# Patient Record
Sex: FEMALE | Race: WHITE | NOT HISPANIC OR LATINO | Employment: OTHER | URBAN - METROPOLITAN AREA
[De-identification: names, ages, dates, MRNs, and addresses within clinical notes are randomized per-mention and may not be internally consistent; named-entity substitution may affect disease eponyms.]

---

## 2017-03-02 ENCOUNTER — OFFICE VISIT (OUTPATIENT)
Dept: FAMILY MEDICINE CLINIC | Facility: CLINIC | Age: 52
End: 2017-03-02

## 2017-03-02 VITALS
DIASTOLIC BLOOD PRESSURE: 80 MMHG | HEART RATE: 73 BPM | HEIGHT: 63 IN | RESPIRATION RATE: 16 BRPM | BODY MASS INDEX: 36.14 KG/M2 | OXYGEN SATURATION: 97 % | SYSTOLIC BLOOD PRESSURE: 130 MMHG | WEIGHT: 204 LBS

## 2017-03-02 DIAGNOSIS — F41.9 ANXIETY: Primary | ICD-10-CM

## 2017-03-02 PROCEDURE — 99213 OFFICE O/P EST LOW 20 MIN: CPT | Performed by: INTERNAL MEDICINE

## 2017-03-02 RX ORDER — ALPRAZOLAM 0.5 MG/1
0.5 TABLET ORAL 2 TIMES DAILY PRN
Qty: 30 TABLET | Refills: 0 | Status: SHIPPED | OUTPATIENT
Start: 2017-03-02 | End: 2017-06-01

## 2017-03-02 NOTE — PROGRESS NOTES
"Subjective   Patient ID: Stacy Salomon is a 51 y.o. female presents with   Chief Complaint   Patient presents with   • Anxiety     B[facility] Patient-  suddenly passed away needs medication for panic attack, anxiety , insomnia   • Depression       HPI - this patient presents today with grief and insomnia.  Her   in the last few days unexpectedly.  She's been having panic attacks and has been unable to sleep.  She has had Xanax in the past and requests this she will use it sparingly.  I told her not to drive after taking this medicine.  She agreed.    Assessment plan    Anxiety and grief and insomnia-Xanax 0.5 1/2-1 by mouth twice a day when necessary.  She has grief counseling arranged.    No Known Allergies    The following portions of the patient's history were reviewed and updated as appropriate: allergies, current medications, past family history, past medical history, past social history, past surgical history and problem list.      Review of Systems   Constitutional: Negative.    HENT: Negative.    Respiratory: Negative.    Cardiovascular: Negative.    Psychiatric/Behavioral: Positive for sleep disturbance. The patient is nervous/anxious.        Objective     Vitals:    17 0840   BP: 130/80   Pulse: 73   Resp: 16   SpO2: 97%   Weight: 204 lb (92.5 kg)   Height: 63\" (160 cm)         Physical Exam   Constitutional: She appears well-developed and well-nourished.   HENT:   Head: Normocephalic and atraumatic.   Eyes: EOM are normal. Pupils are equal, round, and reactive to light.   Cardiovascular: Normal rate, regular rhythm and normal heart sounds.    Pulmonary/Chest: Effort normal.   Psychiatric: She has a normal mood and affect. Her behavior is normal.   Nursing note and vitals reviewed.        Stacy was seen today for anxiety and depression.    Diagnoses and all orders for this visit:    Anxiety    Other orders  -     ALPRAZolam (XANAX) 0.5 MG tablet; Take 1 tablet by mouth 2 (Two) Times a Day " As Needed for anxiety.        Call or return to clinic prn if these symptoms worsen or fail to improve as anticipated.

## 2017-06-01 ENCOUNTER — OFFICE VISIT (OUTPATIENT)
Dept: FAMILY MEDICINE CLINIC | Facility: CLINIC | Age: 52
End: 2017-06-01

## 2017-06-01 VITALS
WEIGHT: 214.2 LBS | TEMPERATURE: 97.8 F | BODY MASS INDEX: 37.95 KG/M2 | HEART RATE: 68 BPM | SYSTOLIC BLOOD PRESSURE: 116 MMHG | HEIGHT: 63 IN | DIASTOLIC BLOOD PRESSURE: 80 MMHG | OXYGEN SATURATION: 97 %

## 2017-06-01 DIAGNOSIS — S63.501A WRIST SPRAIN, RIGHT, INITIAL ENCOUNTER: Primary | ICD-10-CM

## 2017-06-01 PROCEDURE — 99213 OFFICE O/P EST LOW 20 MIN: CPT | Performed by: FAMILY MEDICINE

## 2017-06-01 RX ORDER — FLUTICASONE PROPIONATE 50 MCG
1 SPRAY, SUSPENSION (ML) NASAL
COMMUNITY
Start: 2017-04-20 | End: 2018-07-09

## 2017-06-01 RX ORDER — MONTELUKAST SODIUM 10 MG/1
10 TABLET ORAL
COMMUNITY
Start: 2017-04-20 | End: 2017-09-02 | Stop reason: SDUPTHER

## 2017-06-01 NOTE — PROGRESS NOTES
"Subjective   Stacy Salomon is a 51 y.o. female.     Chief Complaint   Patient presents with   • Wrist Pain     right wrist pain x 2 wks NKI        History of Present Illness    Wrist pain.  2 or 3 weeks.  Doing some repetitive motion at home.  Painting and cleaning.   passed away February of this year with sudden cardiac death.  She's coping.  Daughter and she is going to grief counseling.    The wrist pain is not getting better.  Hurts when she just moves it certain ways.  She feels a \"clunk\".  There is been no swelling.  No radiculopathy.  No weakness.      The following portions of the patient's history were reviewed and updated as appropriate: allergies, current medications, past family history, past medical history, past social history, past surgical history and problem list.          Review of Systems   Constitutional: Negative for fever.   Musculoskeletal: Negative for joint swelling.       Objective   Blood pressure 116/80, pulse 68, temperature 97.8 °F (36.6 °C), temperature source Oral, height 63\" (160 cm), weight 214 lb 3.2 oz (97.2 kg), SpO2 97 %.  Physical Exam   Constitutional: No distress.   Musculoskeletal: Normal range of motion. She exhibits no edema or deformity.   Right wrist full range of motion.  No effusion.  She has some tenderness around the ulnar styloid but not on the bone itself.  More ligamentous.  No snuffbox tenderness.  No skin changes.   Skin: She is not diaphoretic.       Assessment/Plan   Stacy was seen today for wrist pain.    Diagnoses and all orders for this visit:    Wrist sprain, right, initial encounter      Mild wrist sprain.  Related to over use.  Repetitive motion at home.  At this time recommend observation.  She has a carpal tunnel splint she can wear from time to time.  Occasional Aleve as needed.  If not improved in 2 weeks, please call I would then do an x-ray.  Would also consider referral to hand surgery.    Grief.  Coping.  Counseling given today.         "

## 2017-09-05 RX ORDER — MONTELUKAST SODIUM 10 MG/1
TABLET ORAL
Qty: 30 TABLET | Refills: 5 | Status: SHIPPED | OUTPATIENT
Start: 2017-09-05 | End: 2018-04-08 | Stop reason: SDUPTHER

## 2018-04-09 RX ORDER — MONTELUKAST SODIUM 10 MG/1
TABLET ORAL
Qty: 30 TABLET | Refills: 5 | Status: SHIPPED | OUTPATIENT
Start: 2018-04-09 | End: 2018-10-23

## 2018-04-10 ENCOUNTER — APPOINTMENT (OUTPATIENT)
Dept: WOMENS IMAGING | Facility: HOSPITAL | Age: 53
End: 2018-04-10

## 2018-04-10 PROCEDURE — 77063 BREAST TOMOSYNTHESIS BI: CPT | Performed by: RADIOLOGY

## 2018-04-10 PROCEDURE — 77067 SCR MAMMO BI INCL CAD: CPT | Performed by: RADIOLOGY

## 2018-07-09 ENCOUNTER — OFFICE VISIT (OUTPATIENT)
Dept: FAMILY MEDICINE CLINIC | Facility: CLINIC | Age: 53
End: 2018-07-09

## 2018-07-09 VITALS
HEART RATE: 70 BPM | OXYGEN SATURATION: 96 % | HEIGHT: 63 IN | TEMPERATURE: 97.6 F | WEIGHT: 224.9 LBS | SYSTOLIC BLOOD PRESSURE: 135 MMHG | DIASTOLIC BLOOD PRESSURE: 81 MMHG | BODY MASS INDEX: 39.85 KG/M2

## 2018-07-09 DIAGNOSIS — R00.0 TACHYCARDIA: ICD-10-CM

## 2018-07-09 DIAGNOSIS — R03.0 ELEVATED BLOOD PRESSURE READING: Primary | ICD-10-CM

## 2018-07-09 DIAGNOSIS — Z00.00 HEALTH CARE MAINTENANCE: ICD-10-CM

## 2018-07-09 PROCEDURE — 99214 OFFICE O/P EST MOD 30 MIN: CPT | Performed by: FAMILY MEDICINE

## 2018-07-09 NOTE — PROGRESS NOTES
"Sean Salomon is a 52 y.o. female.     Chief Complaint   Patient presents with   • Hypertension     x 2 weeks   • Rapid Heart Rate        History of Present Illness    Elevated blood pressure readings.  Up to 140 or 150/90.  Mostly in the 120 or 130 range systolic.  And mostly high 80s diastolic.  She's had some heart rates in the 80s or 90s.  Nothing higher.  She's had some stressors both at work and at home.  She's gained weight.  She's not been exercising.  She has a .  She's not using the .  The stressors are stable.  No major depression symptoms at this time.  She continues with grief with the loss of her  last year.  Some vague dizziness.  No vertigo.  No syncope.      The following portions of the patient's history were reviewed and updated as appropriate: allergies, current medications, past family history, past medical history, past social history, past surgical history and problem list.          Review of Systems   Constitutional: Negative.    Respiratory: Negative.  Negative for shortness of breath.    Cardiovascular: Negative.  Negative for chest pain.   Musculoskeletal: Negative.    Neurological: Negative.  Negative for light-headedness and headaches.   Psychiatric/Behavioral: Negative for dysphoric mood. The patient is nervous/anxious.        Objective   Blood pressure 135/81, pulse 70, temperature 97.6 °F (36.4 °C), temperature source Oral, height 160 cm (62.99\"), weight 102 kg (224 lb 14.4 oz), SpO2 96 %.  Physical Exam   Constitutional: She appears well-developed and well-nourished. No distress.   Neck: No thyromegaly present.   Cardiovascular: Normal rate, regular rhythm, normal heart sounds and intact distal pulses.    Pulmonary/Chest: Effort normal and breath sounds normal.   Musculoskeletal: She exhibits no edema.   Skin: Skin is warm and dry.   Psychiatric: She has a normal mood and affect. Her behavior is normal. Judgment and thought content normal. "   Nursing note and vitals reviewed.      Assessment/Plan   Stacy was seen today for hypertension and rapid heart rate.    Diagnoses and all orders for this visit:    Elevated blood pressure reading    Tachycardia  -     CBC & Differential; Future  -     TSH Rfx On Abnormal To Free T4; Future    Health care maintenance  -     CBC & Differential; Future  -     Comprehensive Metabolic Panel; Future  -     Lipid Panel; Future  -     TSH Rfx On Abnormal To Free T4; Future  -     Vitamin D 1,25 Dihydroxy; Future      Elevated blood pressure readings.  Relative tachycardia.  Weight gain.  Stressors.  Poor diet.  Lack of exercise.  At this time recommend diet modification and exercise implementation.  She has a concrete plan.  She'll continue to monitor blood pressure readings.  They are borderline hypertensive.  If not vastly improved before next visit in 2 or 3 months, would then recommend treatment with for high blood pressure.

## 2018-10-12 LAB
1,25(OH)2D3 SERPL-MCNC: 66.5 PG/ML (ref 19.9–79.3)
ALBUMIN SERPL-MCNC: 4.4 G/DL (ref 3.5–5.5)
ALBUMIN/GLOB SERPL: 1.4 {RATIO} (ref 1.2–2.2)
ALP SERPL-CCNC: 80 IU/L (ref 39–117)
ALT SERPL-CCNC: 17 IU/L (ref 0–32)
AST SERPL-CCNC: 15 IU/L (ref 0–40)
BASOPHILS # BLD AUTO: 0 X10E3/UL (ref 0–0.2)
BASOPHILS NFR BLD AUTO: 1 %
BILIRUB SERPL-MCNC: 0.3 MG/DL (ref 0–1.2)
BUN SERPL-MCNC: 17 MG/DL (ref 6–24)
BUN/CREAT SERPL: 26 (ref 9–23)
CALCIUM SERPL-MCNC: 9.6 MG/DL (ref 8.7–10.2)
CHLORIDE SERPL-SCNC: 103 MMOL/L (ref 96–106)
CHOLEST SERPL-MCNC: 148 MG/DL (ref 100–199)
CO2 SERPL-SCNC: 24 MMOL/L (ref 20–29)
CREAT SERPL-MCNC: 0.66 MG/DL (ref 0.57–1)
EOSINOPHIL # BLD AUTO: 0.2 X10E3/UL (ref 0–0.4)
EOSINOPHIL NFR BLD AUTO: 2 %
ERYTHROCYTE [DISTWIDTH] IN BLOOD BY AUTOMATED COUNT: 13.9 % (ref 12.3–15.4)
GLOBULIN SER CALC-MCNC: 3.1 G/DL (ref 1.5–4.5)
GLUCOSE SERPL-MCNC: 94 MG/DL (ref 65–99)
HCT VFR BLD AUTO: 40.7 % (ref 34–46.6)
HDLC SERPL-MCNC: 57 MG/DL
HGB BLD-MCNC: 13.8 G/DL (ref 11.1–15.9)
IMM GRANULOCYTES # BLD: 0 X10E3/UL (ref 0–0.1)
IMM GRANULOCYTES NFR BLD: 0 %
LDLC SERPL CALC-MCNC: 83 MG/DL (ref 0–99)
LYMPHOCYTES # BLD AUTO: 3.5 X10E3/UL (ref 0.7–3.1)
LYMPHOCYTES NFR BLD AUTO: 44 %
MCH RBC QN AUTO: 29.6 PG (ref 26.6–33)
MCHC RBC AUTO-ENTMCNC: 33.9 G/DL (ref 31.5–35.7)
MCV RBC AUTO: 87 FL (ref 79–97)
MONOCYTES # BLD AUTO: 0.4 X10E3/UL (ref 0.1–0.9)
MONOCYTES NFR BLD AUTO: 5 %
NEUTROPHILS # BLD AUTO: 3.8 X10E3/UL (ref 1.4–7)
NEUTROPHILS NFR BLD AUTO: 48 %
PLATELET # BLD AUTO: 356 X10E3/UL (ref 150–379)
POTASSIUM SERPL-SCNC: 4.7 MMOL/L (ref 3.5–5.2)
PROT SERPL-MCNC: 7.5 G/DL (ref 6–8.5)
RBC # BLD AUTO: 4.67 X10E6/UL (ref 3.77–5.28)
SODIUM SERPL-SCNC: 141 MMOL/L (ref 134–144)
TRIGL SERPL-MCNC: 42 MG/DL (ref 0–149)
TSH SERPL DL<=0.005 MIU/L-ACNC: 2.06 UIU/ML (ref 0.45–4.5)
VLDLC SERPL CALC-MCNC: 8 MG/DL (ref 5–40)
WBC # BLD AUTO: 7.8 X10E3/UL (ref 3.4–10.8)

## 2018-10-23 ENCOUNTER — OFFICE VISIT (OUTPATIENT)
Dept: FAMILY MEDICINE CLINIC | Facility: CLINIC | Age: 53
End: 2018-10-23

## 2018-10-23 VITALS
HEIGHT: 63 IN | BODY MASS INDEX: 38.95 KG/M2 | TEMPERATURE: 97.6 F | WEIGHT: 219.8 LBS | DIASTOLIC BLOOD PRESSURE: 84 MMHG | HEART RATE: 68 BPM | SYSTOLIC BLOOD PRESSURE: 144 MMHG | OXYGEN SATURATION: 95 %

## 2018-10-23 DIAGNOSIS — I10 ESSENTIAL HYPERTENSION: ICD-10-CM

## 2018-10-23 DIAGNOSIS — Z23 NEED FOR HEPATITIS VACCINATION: ICD-10-CM

## 2018-10-23 DIAGNOSIS — Z23 NEED FOR IMMUNIZATION AGAINST INFLUENZA: ICD-10-CM

## 2018-10-23 DIAGNOSIS — L98.9 SKIN LESION: ICD-10-CM

## 2018-10-23 DIAGNOSIS — Z12.11 ENCOUNTER FOR SCREENING COLONOSCOPY: ICD-10-CM

## 2018-10-23 DIAGNOSIS — Z00.00 HEALTH CARE MAINTENANCE: Primary | ICD-10-CM

## 2018-10-23 PROCEDURE — 99396 PREV VISIT EST AGE 40-64: CPT | Performed by: FAMILY MEDICINE

## 2018-10-23 PROCEDURE — 90471 IMMUNIZATION ADMIN: CPT | Performed by: FAMILY MEDICINE

## 2018-10-23 PROCEDURE — 90632 HEPA VACCINE ADULT IM: CPT | Performed by: FAMILY MEDICINE

## 2018-10-23 PROCEDURE — 90674 CCIIV4 VAC NO PRSV 0.5 ML IM: CPT | Performed by: FAMILY MEDICINE

## 2018-10-23 PROCEDURE — 90472 IMMUNIZATION ADMIN EACH ADD: CPT | Performed by: FAMILY MEDICINE

## 2018-10-23 RX ORDER — HYDROCHLOROTHIAZIDE 12.5 MG/1
12.5 TABLET ORAL DAILY
Qty: 90 TABLET | Refills: 1 | Status: SHIPPED | OUTPATIENT
Start: 2018-10-23 | End: 2019-04-23 | Stop reason: SDUPTHER

## 2018-10-23 NOTE — PROGRESS NOTES
"Sean Salomon is a 52 y.o. female.     Chief Complaint   Patient presents with   • Annual Exam     follow up labs        History of Present Illness     Elevated blood pressure readings.  She's been checking at home.  Especially over the summer.  Running about 140 over high 80s on average.  No cardiovascular symptoms.  She is going to her .  She has lost some weight.  She's been watching her diet.    Social History   Substance Use Topics   • Smoking status: Never Smoker   • Smokeless tobacco: Never Used   • Alcohol use Yes      Comment: occ     Immunization History   Administered Date(s) Administered   • Tdap 05/14/2011     Family History   Problem Relation Age of Onset   • No Known Problems Mother    • Heart disease Father    • Hyperlipidemia Father      PHQ-2 Depression Screening  Little interest or pleasure in doing things? 0   Feeling down, depressed, or hopeless? 0   PHQ-2 Total Score 0           The following portions of the patient's history were reviewed and updated as appropriate: allergies, current medications, past family history, past medical history, past social history, past surgical history and problem list.          Review of Systems   Constitutional: Negative.    HENT: Negative.    Eyes: Negative.    Respiratory: Negative.    Cardiovascular: Negative.    Gastrointestinal: Negative.  Negative for blood in stool.   Endocrine: Negative.    Genitourinary: Negative.  Negative for difficulty urinating and hematuria.   Musculoskeletal: Negative.    Skin: Negative.    Neurological: Negative for headaches.   Psychiatric/Behavioral: Negative.  Negative for dysphoric mood.   All other systems reviewed and are negative.      Objective   Blood pressure 144/84, pulse 68, temperature 97.6 °F (36.4 °C), temperature source Oral, height 160 cm (62.99\"), weight 99.7 kg (219 lb 12.8 oz), SpO2 95 %.  Physical Exam   Constitutional: She is oriented to person, place, and time. She appears " well-developed and well-nourished.   HENT:   Head: Normocephalic and atraumatic.   Right Ear: Tympanic membrane, external ear and ear canal normal.   Left Ear: Tympanic membrane, external ear and ear canal normal.   Nose: Nose normal.   Mouth/Throat: Oropharynx is clear and moist. No oropharyngeal exudate.   Eyes: Pupils are equal, round, and reactive to light. EOM are normal. No scleral icterus.   Neck: Normal range of motion. Neck supple. No thyromegaly present.   Cardiovascular: Normal rate, regular rhythm, normal heart sounds and intact distal pulses.    No murmur heard.  Pulmonary/Chest: Effort normal and breath sounds normal. She has no wheezes.   Abdominal: Soft. Bowel sounds are normal. She exhibits no distension and no mass. There is no tenderness. No hernia.   Musculoskeletal: Normal range of motion. She exhibits no edema.   Lymphadenopathy:     She has no cervical adenopathy.   Neurological: She is alert and oriented to person, place, and time. She exhibits normal muscle tone.   Skin: Skin is warm and dry. No rash noted.   Darkly pigmented nodular lesion uniform border oval left abdomen.  Patient states his been there since she can remember.  It gets irritated.   Psychiatric: She has a normal mood and affect. Her behavior is normal. Judgment and thought content normal.   Nursing note and vitals reviewed.      Assessment/Plan   Stacy was seen today for annual exam.    Diagnoses and all orders for this visit:    Health care maintenance    Essential hypertension    Encounter for screening colonoscopy  -     Ambulatory Referral For Screening Colonoscopy    Skin lesion  -     Ambulatory Referral to Dermatology    Other orders  -     hydrochlorothiazide (HYDRODIURIL) 12.5 MG tablet; Take 1 tablet by mouth Daily.      Annual wellness visit.  Next    Immunizations.  Hepatitis A #1 and flu vaccine today.    Colon cancer screening.  Colonoscopy ordered.  Risks and benefits discussed in detail.    Cervical cancer  screening and breast cancer screening through gynecology.  Reportedly up-to-date.    Essential hypertension.  New diagnosis.  Starting hydrochlorothiazide 12.5 mg a day.  She'll send me readings next few weeks.  I'll see her in 6 months for recheck.    Pigmented skin lesion.  Left abdomen.  Referral to dermatology.    Exercise and diet discussed.

## 2018-10-26 ENCOUNTER — TELEPHONE (OUTPATIENT)
Dept: FAMILY MEDICINE CLINIC | Facility: CLINIC | Age: 53
End: 2018-10-26

## 2018-10-26 NOTE — TELEPHONE ENCOUNTER
We just got prescribed a new HCT she has not taken it yet she wanted to make sure before taking it this was not going to be a long turn medication and if she would be able to come off this medication later. Please advise.

## 2018-10-26 NOTE — TELEPHONE ENCOUNTER
She may not be on long-term.  Depends on diet and exercise and other factors.  Problem is her blood pressure is high now.  Needs treatment.

## 2019-01-21 ENCOUNTER — RESULTS ENCOUNTER (OUTPATIENT)
Dept: FAMILY MEDICINE CLINIC | Facility: CLINIC | Age: 54
End: 2019-01-21

## 2019-01-21 DIAGNOSIS — I10 ESSENTIAL HYPERTENSION: ICD-10-CM

## 2019-03-18 ENCOUNTER — TELEPHONE (OUTPATIENT)
Dept: FAMILY MEDICINE CLINIC | Facility: CLINIC | Age: 54
End: 2019-03-18

## 2019-03-18 ENCOUNTER — APPOINTMENT (OUTPATIENT)
Dept: GENERAL RADIOLOGY | Facility: HOSPITAL | Age: 54
End: 2019-03-18

## 2019-03-18 PROCEDURE — 73560 X-RAY EXAM OF KNEE 1 OR 2: CPT | Performed by: GENERAL PRACTICE

## 2019-03-18 NOTE — TELEPHONE ENCOUNTER
Pt is calling she had went to the  today for her knee pain. She was given Celexa for the pain. She was told that she was not supposed to take any antiinflammatory  with her blood pressure medication. She is wanting to know if its ok to take for 7 days or should she stop her blood pressure medication or is there something else. Please advise

## 2019-03-18 NOTE — TELEPHONE ENCOUNTER
Likely gave her Celebrex for the pain, not Celexa.  This will be okay to take on a very limited basis.  Okay for 7 days.  She should not stop her blood pressure medicine.  Celebrex is similar to ibuprofen but perhaps safer on the stomach.

## 2019-04-11 ENCOUNTER — APPOINTMENT (OUTPATIENT)
Dept: WOMENS IMAGING | Facility: HOSPITAL | Age: 54
End: 2019-04-11

## 2019-04-11 PROCEDURE — 77067 SCR MAMMO BI INCL CAD: CPT | Performed by: RADIOLOGY

## 2019-04-11 PROCEDURE — 77063 BREAST TOMOSYNTHESIS BI: CPT | Performed by: RADIOLOGY

## 2019-04-15 ENCOUNTER — APPOINTMENT (OUTPATIENT)
Dept: WOMENS IMAGING | Facility: HOSPITAL | Age: 54
End: 2019-04-15

## 2019-04-15 PROCEDURE — 76641 ULTRASOUND BREAST COMPLETE: CPT | Performed by: RADIOLOGY

## 2019-04-23 ENCOUNTER — OFFICE VISIT (OUTPATIENT)
Dept: FAMILY MEDICINE CLINIC | Facility: CLINIC | Age: 54
End: 2019-04-23

## 2019-04-23 VITALS
OXYGEN SATURATION: 95 % | WEIGHT: 221.4 LBS | SYSTOLIC BLOOD PRESSURE: 126 MMHG | BODY MASS INDEX: 39.23 KG/M2 | HEIGHT: 63 IN | TEMPERATURE: 98 F | DIASTOLIC BLOOD PRESSURE: 83 MMHG | HEART RATE: 77 BPM

## 2019-04-23 DIAGNOSIS — Z23 NEED FOR HEPATITIS VACCINATION: ICD-10-CM

## 2019-04-23 DIAGNOSIS — I10 ESSENTIAL HYPERTENSION: Primary | ICD-10-CM

## 2019-04-23 PROBLEM — R01.1 MURMUR, HEART: Status: ACTIVE | Noted: 2019-04-23

## 2019-04-23 PROCEDURE — 99213 OFFICE O/P EST LOW 20 MIN: CPT | Performed by: FAMILY MEDICINE

## 2019-04-23 PROCEDURE — 90471 IMMUNIZATION ADMIN: CPT | Performed by: FAMILY MEDICINE

## 2019-04-23 PROCEDURE — 90632 HEPA VACCINE ADULT IM: CPT | Performed by: FAMILY MEDICINE

## 2019-04-23 RX ORDER — HYDROCHLOROTHIAZIDE 12.5 MG/1
12.5 TABLET ORAL DAILY
Qty: 30 TABLET | Refills: 0 | Status: SHIPPED | OUTPATIENT
Start: 2019-04-23 | End: 2019-10-24

## 2019-04-23 RX ORDER — HYDROCHLOROTHIAZIDE 12.5 MG/1
12.5 TABLET ORAL DAILY
Qty: 90 TABLET | Refills: 1 | Status: SHIPPED | OUTPATIENT
Start: 2019-04-23 | End: 2019-10-24 | Stop reason: SDUPTHER

## 2019-04-23 NOTE — PROGRESS NOTES
"Subjective   Stacy Salomon is a 53 y.o. female.     Hypertension (pt is fasting)    History of Present Illness    Hypertension follow up. Doing well with current medication which she is taking as directed. No known high or low blood pressure episodes. No cardiovascular or neurological symptoms. Today's BP: 126/83.  I reviewed her blood pressure readings.  120s over mid 80s.  Taking it fairly regularly.  She is having no urinary issues with the diuretic.  She has not been exercising as much.  She is making changes with that.  She is also interested in losing more weight.        Last lipid panel:   Lab Results   Component Value Date    HDL 57 10/11/2018     Lab Results   Component Value Date    LDL 83 10/11/2018     Lab Results   Component Value Date    TRIG 42 10/11/2018       The following portions of the patient's history were reviewed and updated as appropriate: allergies, current medications, past family history, past medical history, past social history, past surgical history and problem list.      Review of Systems   Constitutional: Negative.    Respiratory: Negative.    Cardiovascular: Negative.    Musculoskeletal: Negative.        Objective   Blood pressure 126/83, pulse 77, temperature 98 °F (36.7 °C), temperature source Oral, height 160 cm (62.99\"), weight 100 kg (221 lb 6.4 oz), SpO2 95 %.  Physical Exam   Constitutional: She appears well-developed and well-nourished. No distress.   Neck: No thyromegaly present.   Cardiovascular: Normal rate, regular rhythm and intact distal pulses.   Murmur ( Faint 1/6 systolic murmur right sternal border.  Normal echocardiogram 2016) heard.  Pulmonary/Chest: Effort normal and breath sounds normal.   Musculoskeletal: She exhibits no edema.   Skin: Skin is warm and dry.   Psychiatric: She has a normal mood and affect. Her behavior is normal. Judgment and thought content normal.   Nursing note and vitals reviewed.      Assessment/Plan   Stacy was seen today for " hypertension.    Diagnoses and all orders for this visit:    Essential hypertension    Other orders  -     hydrochlorothiazide (HYDRODIURIL) 12.5 MG tablet; Take 1 tablet by mouth Daily.  -     hydrochlorothiazide (HYDRODIURIL) 12.5 MG tablet; Take 1 tablet by mouth Daily.      Hypertension.  Better control.  Continue hydrochlorothiazide 12.5 mg a day.  We discussed diet and exercise in great detail.  She is going to go to [employer].  She is going to get the [employer] phone eliane.  I will see her back in 6 months for annual wellness visit.  Blood work prior.  CMP today.

## 2019-05-23 RX ORDER — HYDROCHLOROTHIAZIDE 12.5 MG/1
12.5 TABLET ORAL DAILY
Qty: 30 TABLET | Refills: 5 | OUTPATIENT
Start: 2019-05-23

## 2019-10-24 ENCOUNTER — OFFICE VISIT (OUTPATIENT)
Dept: FAMILY MEDICINE CLINIC | Facility: CLINIC | Age: 54
End: 2019-10-24

## 2019-10-24 VITALS
HEIGHT: 63 IN | HEART RATE: 77 BPM | DIASTOLIC BLOOD PRESSURE: 75 MMHG | SYSTOLIC BLOOD PRESSURE: 127 MMHG | TEMPERATURE: 96.9 F | OXYGEN SATURATION: 98 % | WEIGHT: 214.2 LBS | BODY MASS INDEX: 37.95 KG/M2

## 2019-10-24 DIAGNOSIS — Z00.00 HEALTH CARE MAINTENANCE: Primary | ICD-10-CM

## 2019-10-24 DIAGNOSIS — I10 ESSENTIAL HYPERTENSION: ICD-10-CM

## 2019-10-24 DIAGNOSIS — Z12.11 ENCOUNTER FOR SCREENING COLONOSCOPY: ICD-10-CM

## 2019-10-24 DIAGNOSIS — Z23 NEED FOR IMMUNIZATION AGAINST INFLUENZA: ICD-10-CM

## 2019-10-24 LAB
ALBUMIN SERPL-MCNC: 4.7 G/DL (ref 3.5–5.2)
ALBUMIN/GLOB SERPL: 1.7 G/DL
ALP SERPL-CCNC: 80 U/L (ref 39–117)
ALT SERPL-CCNC: 14 U/L (ref 1–33)
AST SERPL-CCNC: 11 U/L (ref 1–32)
BASOPHILS # BLD AUTO: 0.06 10*3/MM3 (ref 0–0.2)
BASOPHILS NFR BLD AUTO: 0.8 % (ref 0–1.5)
BILIRUB SERPL-MCNC: 0.4 MG/DL (ref 0.2–1.2)
BUN SERPL-MCNC: 20 MG/DL (ref 6–20)
BUN/CREAT SERPL: 29 (ref 7–25)
CALCIUM SERPL-MCNC: 9.7 MG/DL (ref 8.6–10.5)
CHLORIDE SERPL-SCNC: 102 MMOL/L (ref 98–107)
CHOLEST SERPL-MCNC: 162 MG/DL (ref 0–200)
CO2 SERPL-SCNC: 29.3 MMOL/L (ref 22–29)
CREAT SERPL-MCNC: 0.69 MG/DL (ref 0.57–1)
EOSINOPHIL # BLD AUTO: 0.13 10*3/MM3 (ref 0–0.4)
EOSINOPHIL NFR BLD AUTO: 1.8 % (ref 0.3–6.2)
ERYTHROCYTE [DISTWIDTH] IN BLOOD BY AUTOMATED COUNT: 12.9 % (ref 12.3–15.4)
GLOBULIN SER CALC-MCNC: 2.7 GM/DL
GLUCOSE SERPL-MCNC: 94 MG/DL (ref 65–99)
HCT VFR BLD AUTO: 40.4 % (ref 34–46.6)
HDLC SERPL-MCNC: 65 MG/DL (ref 40–60)
HGB BLD-MCNC: 13.4 G/DL (ref 12–15.9)
IMM GRANULOCYTES # BLD AUTO: 0.02 10*3/MM3 (ref 0–0.05)
IMM GRANULOCYTES NFR BLD AUTO: 0.3 % (ref 0–0.5)
LDLC SERPL CALC-MCNC: 84 MG/DL (ref 0–100)
LYMPHOCYTES # BLD AUTO: 2.72 10*3/MM3 (ref 0.7–3.1)
LYMPHOCYTES NFR BLD AUTO: 37.3 % (ref 19.6–45.3)
MCH RBC QN AUTO: 30.2 PG (ref 26.6–33)
MCHC RBC AUTO-ENTMCNC: 33.2 G/DL (ref 31.5–35.7)
MCV RBC AUTO: 91.2 FL (ref 79–97)
MONOCYTES # BLD AUTO: 0.42 10*3/MM3 (ref 0.1–0.9)
MONOCYTES NFR BLD AUTO: 5.8 % (ref 5–12)
NEUTROPHILS # BLD AUTO: 3.94 10*3/MM3 (ref 1.7–7)
NEUTROPHILS NFR BLD AUTO: 54 % (ref 42.7–76)
NRBC BLD AUTO-RTO: 0 /100 WBC (ref 0–0.2)
PLATELET # BLD AUTO: 338 10*3/MM3 (ref 140–450)
POTASSIUM SERPL-SCNC: 4.2 MMOL/L (ref 3.5–5.2)
PROT SERPL-MCNC: 7.4 G/DL (ref 6–8.5)
RBC # BLD AUTO: 4.43 10*6/MM3 (ref 3.77–5.28)
SODIUM SERPL-SCNC: 143 MMOL/L (ref 136–145)
TRIGL SERPL-MCNC: 66 MG/DL (ref 0–150)
VLDLC SERPL CALC-MCNC: 13.2 MG/DL
WBC # BLD AUTO: 7.29 10*3/MM3 (ref 3.4–10.8)

## 2019-10-24 PROCEDURE — 90471 IMMUNIZATION ADMIN: CPT | Performed by: FAMILY MEDICINE

## 2019-10-24 PROCEDURE — 90674 CCIIV4 VAC NO PRSV 0.5 ML IM: CPT | Performed by: FAMILY MEDICINE

## 2019-10-24 PROCEDURE — 99396 PREV VISIT EST AGE 40-64: CPT | Performed by: FAMILY MEDICINE

## 2019-10-24 RX ORDER — HYDROCHLOROTHIAZIDE 12.5 MG/1
12.5 TABLET ORAL DAILY
Qty: 90 TABLET | Refills: 1 | Status: SHIPPED | OUTPATIENT
Start: 2019-10-24 | End: 2020-05-18

## 2019-10-24 NOTE — PROGRESS NOTES
Subjective   Stacy Salomon is a 53 y.o. female.     Annual Exam (pt is not fating)    History of Present Illness     Here for annual wellness visit.  She is been very physically active.  Riding bikes.  Walking with friends.  She is lost about 12 pounds at home.  Through decrease carbohydrates and improved food choices.  She is feeling well.  She will be visiting her daughter in [place] soon.  Minimal alcohol use.  Flu vaccine today.  Seeing gynecologist soon.  She has not had a colonoscopy.    Hypertension follow up. Doing well with current medication which she is taking as directed. No known high or low blood pressure episodes. No cardiovascular or neurological symptoms. Today's BP: 127/75.      Lab Results   Component Value Date    CHLPL 148 10/11/2018    TRIG 42 10/11/2018    HDL 57 10/11/2018    LDL 83 10/11/2018     Social History     Tobacco Use   • Smoking status: Never Smoker   • Smokeless tobacco: Never Used   Substance Use Topics   • Alcohol use: Yes     Comment: 2 glasses of wine/ month.   2 beers / month.  1 cocktail/mon   • Drug use: No     Immunization History   Administered Date(s) Administered   • Hepatitis A 10/23/2018, 04/23/2019   • Tdap 05/14/2011   • flucelvax quad pfs =>4 YRS 10/23/2018     Family History   Problem Relation Age of Onset   • No Known Problems Mother    • Heart disease Father    • Hyperlipidemia Father    • Arthritis Maternal Grandmother    • COPD Maternal Grandmother    • Cancer Paternal Aunt        The following portions of the patient's history were reviewed and updated as appropriate: allergies, current medications, past family history, past medical history, past social history, past surgical history and problem list.      Review of Systems   Constitutional: Negative.    HENT: Negative.    Eyes: Negative.    Respiratory: Negative.    Cardiovascular: Negative.    Gastrointestinal: Positive for blood in stool ( She states she gets occasional bleeding hemorrhoids).        No  "trouble swallowing.  No severe GERD symptoms.   Endocrine: Negative.    Genitourinary: Negative.  Negative for hematuria.   Musculoskeletal: Negative.    Skin: Negative.    Psychiatric/Behavioral: Negative.    All other systems reviewed and are negative.      Objective   Blood pressure 127/75, pulse 77, temperature 96.9 °F (36.1 °C), temperature source Oral, height 160 cm (62.99\"), weight 97.2 kg (214 lb 3.2 oz), SpO2 98 %.  Physical Exam   Constitutional: She is oriented to person, place, and time. She appears well-developed and well-nourished.   HENT:   Head: Normocephalic and atraumatic.   Right Ear: Tympanic membrane, external ear and ear canal normal.   Left Ear: Tympanic membrane, external ear and ear canal normal.   Nose: Nose normal.   Mouth/Throat: Oropharynx is clear and moist. No oropharyngeal exudate.   Eyes: EOM are normal. Pupils are equal, round, and reactive to light. No scleral icterus.   Neck: Normal range of motion. Neck supple. No thyromegaly present.   Cardiovascular: Normal rate, regular rhythm, normal heart sounds and intact distal pulses.   No murmur heard.  Pulmonary/Chest: Effort normal and breath sounds normal. She has no wheezes.   Abdominal: Soft. Bowel sounds are normal. She exhibits no distension and no mass. There is no tenderness. No hernia.   Musculoskeletal: Normal range of motion. She exhibits no edema.   Lymphadenopathy:     She has no cervical adenopathy.   Neurological: She is alert and oriented to person, place, and time. She exhibits normal muscle tone.   Skin: Skin is warm and dry. No rash noted.   Psychiatric: She has a normal mood and affect. Her behavior is normal. Judgment and thought content normal.   Nursing note and vitals reviewed.      Assessment/Plan   Stacy was seen today for annual exam.    Diagnoses and all orders for this visit:    Health care maintenance  -     CBC & Differential  -     Comprehensive Metabolic Panel  -     Lipid Panel    Essential " hypertension  -     Comprehensive Metabolic Panel    Encounter for screening colonoscopy  -     Ambulatory Referral For Screening Colonoscopy    Other orders  -     hydroCHLOROthiazide (HYDRODIURIL) 12.5 MG tablet; Take 1 tablet by mouth Daily.      Annual wellness visit.    Immunizations.  Flu vaccine today.  Recommend Shingrix at retail pharmacy.    Breast cancer screening and cervical cancer screening through her gynecologist.    Colon cancer screening.  Recommend screening colonoscopy.  She is had some bleeding, likely from hemorrhoids.  But still needs of the colonoscopy done.  She is never had one done yet and she is 53 years old.  Referral to surgery made.    Hypertension.  Overall well controlled.  Blood pressure 120 systolic at home.  She is doing well on low-dose hydrochlorothiazide.  Checking potassium today.  Also checking other screening blood work.    Preventative health practices discussed including regular exercise.  She doing a great job with her diet.  I will see her in 6 months for recheck

## 2019-10-25 NOTE — PROGRESS NOTES
Lab work all looks good.  Cholesterol is favorable including the HDL good cholesterol.  No anemia.  No diabetes.

## 2020-02-03 ENCOUNTER — APPOINTMENT (OUTPATIENT)
Dept: GENERAL RADIOLOGY | Facility: HOSPITAL | Age: 55
End: 2020-02-03

## 2020-02-03 PROCEDURE — 73630 X-RAY EXAM OF FOOT: CPT | Performed by: FAMILY MEDICINE

## 2020-02-12 ENCOUNTER — OFFICE VISIT (OUTPATIENT)
Dept: FAMILY MEDICINE CLINIC | Facility: CLINIC | Age: 55
End: 2020-02-12

## 2020-02-12 VITALS
DIASTOLIC BLOOD PRESSURE: 79 MMHG | OXYGEN SATURATION: 95 % | WEIGHT: 224.6 LBS | HEIGHT: 63 IN | BODY MASS INDEX: 39.8 KG/M2 | SYSTOLIC BLOOD PRESSURE: 116 MMHG | HEART RATE: 91 BPM | TEMPERATURE: 97 F

## 2020-02-12 DIAGNOSIS — L02.411 ABSCESS OF AXILLA, RIGHT: Primary | ICD-10-CM

## 2020-02-12 PROCEDURE — 10060 I&D ABSCESS SIMPLE/SINGLE: CPT | Performed by: FAMILY MEDICINE

## 2020-02-12 PROCEDURE — 99213 OFFICE O/P EST LOW 20 MIN: CPT | Performed by: FAMILY MEDICINE

## 2020-02-12 RX ORDER — SULFAMETHOXAZOLE AND TRIMETHOPRIM 800; 160 MG/1; MG/1
1 TABLET ORAL 2 TIMES DAILY
Qty: 10 TABLET | Refills: 0 | Status: SHIPPED | OUTPATIENT
Start: 2020-02-12 | End: 2020-04-05

## 2020-02-12 NOTE — PROGRESS NOTES
"Subjective   Stacy Salomon is a 54 y.o. female.     Chief Complaint   Patient presents with   • Abscess     x sun under right arm pit        History of Present Illness    Abscess.  Right axilla.  For a few days.  Getting worse.  Painful.  He has had a little bit of discomfort in the right pectoralis.  No fever.  No chills.  She was on prednisone recently for an ankle issue.      The following portions of the patient's history were reviewed and updated as appropriate: allergies, current medications, past family history, past medical history, past social history, past surgical history and problem list.          Review of Systems   Constitutional: Negative.  Negative for fever.   Respiratory: Negative.    Cardiovascular: Negative.    Skin: Negative for wound.   Neurological: Negative.        Objective   Blood pressure 116/79, pulse 91, temperature 97 °F (36.1 °C), temperature source Oral, height 160 cm (62.99\"), weight 102 kg (224 lb 9.6 oz), SpO2 95 %.  Physical Exam   Constitutional: No distress.   Cardiovascular: Normal rate.   Pulmonary/Chest: Effort normal.   Skin:   Inferior to the right axilla is a 2 x 3 cm abscess that is deep to the epidermis.  Started to come to ahead.  No lymphangitis.  No surrounding adenopathy.     We have the equipment available to drain the abscess here.  Patient was instructed on the procedure.  The area was cleaned with alcohol.  About 1 cc of 1% Xylocaine with epinephrine was injected locally.  Good local anesthesia.  With a 15 blade a 1 cm incision was made.  There is a small amount of pus that was expressed and sent for culture.  The area was probed extensively to break up any loculations.  No further abscess or pus found.  The wound was irrigated copiously with multiple 5 cc flushes of sterile saline.  1/4 inch iodoform packing was placed about 2 inches total.  The wound was dressed in usual manner.  No complications.    Assessment/Plan   Stacy was seen today for " abscess.    Diagnoses and all orders for this visit:    Abscess of axilla, right  -     Cancel: Ambulatory Referral to General Surgery    Other orders  -     sulfamethoxazole-trimethoprim (BACTRIM DS) 800-160 MG per tablet; Take 1 tablet by mouth 2 (Two) Times a Day.    Wound instructions given.  Patient understands she get it wet tonight.  I will see her back tomorrow morning to pull the packing.  She will start the antibiotics as above.  If she needs pain medication she will let us know but she should not.  With any severe symptoms she will contact us.  The culture was sent.

## 2020-02-13 ENCOUNTER — OFFICE VISIT (OUTPATIENT)
Dept: FAMILY MEDICINE CLINIC | Facility: CLINIC | Age: 55
End: 2020-02-13

## 2020-02-13 VITALS
OXYGEN SATURATION: 97 % | TEMPERATURE: 97.3 F | WEIGHT: 224 LBS | HEIGHT: 63 IN | DIASTOLIC BLOOD PRESSURE: 87 MMHG | SYSTOLIC BLOOD PRESSURE: 144 MMHG | HEART RATE: 80 BPM | BODY MASS INDEX: 39.69 KG/M2

## 2020-02-13 DIAGNOSIS — L02.411 ABSCESS OF AXILLA, RIGHT: Primary | ICD-10-CM

## 2020-02-13 PROCEDURE — 99024 POSTOP FOLLOW-UP VISIT: CPT | Performed by: FAMILY MEDICINE

## 2020-02-13 NOTE — PROGRESS NOTES
"Subjective   Stacy Salomon is a 54 y.o. female.     Chief Complaint   Patient presents with   • Wound Check     right armpit         History of Present Illness    Post I&D 24-hour check.  Packing in place.  No fever.  She took 1 dose of Bactrim last night.  She is somewhat reluctant to take antibiotics because of previous C. difficile.  The pain is better.  There is some localized soreness but the throbbing is gone.  She has left the packing in and the bandage on.      The following portions of the patient's history were reviewed and updated as appropriate: allergies, current medications, past family history, past medical history, past social history, past surgical history and problem list.          Review of Systems   Constitutional: Negative for fever.   Skin: Positive for wound.       Objective   Blood pressure 144/87, pulse 80, temperature 97.3 °F (36.3 °C), temperature source Oral, height 160 cm (62.99\"), weight 102 kg (224 lb), SpO2 97 %.  Physical Exam   Constitutional: No distress.   Cardiovascular: Normal rate.   Pulmonary/Chest: Effort normal.   Skin: She is not diaphoretic.   The right axilla wound is unremarkable at this time.  There is minimal erythema.  Slightly tender to palpation.  The packing was removed.  Some initial pus was expressed and then further serosanguineous fairly clear discharge.  The wound was bandaged in the usual manner.       Assessment/Plan   Stacy was seen today for wound check.    Diagnoses and all orders for this visit:    Abscess of axilla, right    Other orders  -     mupirocin (BACTROBAN) 2 % ointment; Apply  topically to the appropriate area as directed 3 (Three) Times a Day.      Follow-up right axillary abscess.  Status post I&D yesterday.  Recommend Bactrim for total of 3 days only.  Adding Bactroban ointment.  Stop with diarrhea.  Take probiotics.  With recurrent pain or other symptoms she is going to let us know.  Wound instructions given.  Okay to get wet.  Okay to get " in the shower.  I want her washing with soap and water directly.

## 2020-02-15 LAB
BACTERIA SPEC AEROBE CULT: ABNORMAL
BACTERIA SPEC CULT: ABNORMAL
OTHER ANTIBIOTIC SUSC ISLT: ABNORMAL

## 2020-02-17 NOTE — PROGRESS NOTES
The wound and abscess revealed light growth of Pseudomonas which is a bacteria typically found in water.  May be from the hot tub.  May not.  No further treatment needed as long as the wound is healing.

## 2020-04-05 ENCOUNTER — TELEMEDICINE (OUTPATIENT)
Dept: FAMILY MEDICINE CLINIC | Facility: TELEHEALTH | Age: 55
End: 2020-04-05

## 2020-04-05 DIAGNOSIS — R05.9 COUGH: Primary | ICD-10-CM

## 2020-04-05 PROCEDURE — 99213 OFFICE O/P EST LOW 20 MIN: CPT | Performed by: NURSE PRACTITIONER

## 2020-04-05 RX ORDER — ALBUTEROL SULFATE 90 UG/1
2 AEROSOL, METERED RESPIRATORY (INHALATION) EVERY 4 HOURS PRN
Qty: 1 INHALER | Refills: 0 | Status: SHIPPED | OUTPATIENT
Start: 2020-04-05

## 2020-04-05 RX ORDER — BENZONATATE 100 MG/1
100 CAPSULE ORAL 3 TIMES DAILY PRN
Qty: 30 CAPSULE | Refills: 0 | Status: SHIPPED | OUTPATIENT
Start: 2020-04-05 | End: 2020-04-15

## 2020-04-05 NOTE — PATIENT INSTRUCTIONS
Use inhaler as directed. May use Tessalon perles during day for cough as prescribed. Increase fluids and rest. Acetaminophen or ibuprofen as needed for elevated temperature or discomfort. Re-check with primary provider if not improving over the next week or sooner for new/increasing symptoms.

## 2020-04-05 NOTE — PROGRESS NOTES
Subjective     Stacy Salomon is a 54 y.o. female who presents to the clinic with:      Cough   This is a new problem. Episode onset: 4 weeks ago after having flu. The problem has been unchanged. The problem occurs every few minutes. The cough is non-productive. Pertinent negatives include no fever, headaches, myalgias, postnasal drip, rhinorrhea, sore throat, shortness of breath or wheezing. Chills: Friday night, none since. Associated symptoms comments: Chest feels slightly tight. Nothing aggravates the symptoms. She has tried prescription cough suppressant and rest for the symptoms. Improvement on treatment: cough medicine helps at night.          The following portions of the patient's history were reviewed and updated as appropriate: allergies, current medications, past family history, past medical history, past social history, past surgical history and problem list.      Review of Systems   Constitutional: Negative for fatigue and fever. Chills: Friday night, none since.   HENT: Negative for congestion, postnasal drip, rhinorrhea and sore throat.    Respiratory: Positive for cough and chest tightness. Negative for shortness of breath and wheezing.    Musculoskeletal: Negative for myalgias.   Neurological: Negative for headaches.   All other systems reviewed and are negative.        Objective   Physical Exam   Constitutional: She is oriented to person, place, and time. She appears well-developed and well-nourished.   HENT:   Head: Normocephalic.   Right Ear: Hearing normal.   Left Ear: Hearing normal.   Pulmonary/Chest: Effort normal. No respiratory distress.   Occasional dry cough present   Neurological: She is alert and oriented to person, place, and time.   Skin: Skin is dry.   Psychiatric: She has a normal mood and affect. Her behavior is normal. Thought content normal.         Assessment/Plan   Stacy was seen today for cough.    Diagnoses and all orders for this visit:    Cough    Other orders  -      albuterol sulfate  (90 Base) MCG/ACT inhaler; Inhale 2 puffs Every 4 (Four) Hours As Needed for Wheezing.  -     benzonatate (Tessalon Perles) 100 MG capsule; Take 1 capsule by mouth 3 (Three) Times a Day As Needed for Cough for up to 10 days.          Patient Instructions   Use inhaler as directed. May use Tessalon perles during day for cough as prescribed. Increase fluids and rest. Acetaminophen or ibuprofen as needed for elevated temperature or discomfort. Re-check with primary provider if not improving over the next week or sooner for new/increasing symptoms.     Visit through Virtual Care/Video.

## 2020-05-18 RX ORDER — HYDROCHLOROTHIAZIDE 12.5 MG/1
TABLET ORAL
Qty: 90 TABLET | Refills: 1 | Status: SHIPPED | OUTPATIENT
Start: 2020-05-18 | End: 2020-08-11

## 2020-07-20 ENCOUNTER — E-VISIT (OUTPATIENT)
Dept: FAMILY MEDICINE CLINIC | Facility: CLINIC | Age: 55
End: 2020-07-20

## 2020-07-20 NOTE — PATIENT INSTRUCTIONS
Thank you for completing the electronic visit telehealth screening questionnaire.  I reviewed the report.  You've had a dry cough for 2 weeks without any other definite COVID symptoms.  I do not believe you have medication causing this, some blood pressure medications can cause a cough.  I would recommend an [facility] urgent care visit if you wish.  Or you can schedule an appointment to see myself or ADRI Durand for a telehealth video visit appointment.  You would need to call our office to set up a telehealth visit.  Cannot do it on the eliane.      Dr Hawkins

## 2020-07-20 NOTE — PROGRESS NOTES
Patient has requested a electronic visit.  See questionnaire in chart.  2 weeks of dry cough with no fever.  At this time I recommend a telehealth visit with myself or partner in practice later this week, or she can get to urgent care center today.

## 2020-08-11 RX ORDER — HYDROCHLOROTHIAZIDE 25 MG/1
25 TABLET ORAL DAILY
Qty: 90 TABLET | Refills: 1 | Status: SHIPPED | OUTPATIENT
Start: 2020-08-11 | End: 2020-10-26 | Stop reason: SDUPTHER

## 2020-10-19 DIAGNOSIS — Z12.11 ENCOUNTER FOR SCREENING COLONOSCOPY: Primary | ICD-10-CM

## 2020-10-26 ENCOUNTER — OFFICE VISIT (OUTPATIENT)
Dept: FAMILY MEDICINE CLINIC | Facility: CLINIC | Age: 55
End: 2020-10-26

## 2020-10-26 ENCOUNTER — LAB (OUTPATIENT)
Dept: LAB | Facility: HOSPITAL | Age: 55
End: 2020-10-26

## 2020-10-26 ENCOUNTER — HOSPITAL ENCOUNTER (OUTPATIENT)
Dept: GENERAL RADIOLOGY | Facility: HOSPITAL | Age: 55
Discharge: HOME | End: 2020-10-26

## 2020-10-26 VITALS
OXYGEN SATURATION: 95 % | BODY MASS INDEX: 39.42 KG/M2 | HEIGHT: 63 IN | HEART RATE: 69 BPM | DIASTOLIC BLOOD PRESSURE: 81 MMHG | WEIGHT: 222.5 LBS | TEMPERATURE: 97.3 F | SYSTOLIC BLOOD PRESSURE: 125 MMHG

## 2020-10-26 DIAGNOSIS — I10 ESSENTIAL HYPERTENSION: ICD-10-CM

## 2020-10-26 DIAGNOSIS — Z23 NEED FOR VACCINATION: ICD-10-CM

## 2020-10-26 DIAGNOSIS — Z00.00 HEALTH CARE MAINTENANCE: Primary | ICD-10-CM

## 2020-10-26 DIAGNOSIS — F34.1 DYSTHYMIA: ICD-10-CM

## 2020-10-26 DIAGNOSIS — R05.9 COUGH: ICD-10-CM

## 2020-10-26 LAB
ALBUMIN SERPL-MCNC: 4.3 G/DL (ref 3.5–5.2)
ALBUMIN/GLOB SERPL: 1.3 G/DL
ALP SERPL-CCNC: 85 U/L (ref 39–117)
ALT SERPL W P-5'-P-CCNC: 20 U/L (ref 1–33)
ANION GAP SERPL CALCULATED.3IONS-SCNC: 7.9 MMOL/L (ref 5–15)
AST SERPL-CCNC: 16 U/L (ref 1–32)
BASOPHILS # BLD AUTO: 0.06 10*3/MM3 (ref 0–0.2)
BASOPHILS NFR BLD AUTO: 0.8 % (ref 0–1.5)
BILIRUB SERPL-MCNC: 0.4 MG/DL (ref 0–1.2)
BUN SERPL-MCNC: 18 MG/DL (ref 6–20)
BUN/CREAT SERPL: 26.9 (ref 7–25)
CALCIUM SPEC-SCNC: 9.5 MG/DL (ref 8.6–10.5)
CHLORIDE SERPL-SCNC: 103 MMOL/L (ref 98–107)
CHOLEST SERPL-MCNC: 191 MG/DL (ref 0–200)
CO2 SERPL-SCNC: 28.1 MMOL/L (ref 22–29)
CREAT SERPL-MCNC: 0.67 MG/DL (ref 0.57–1)
DEPRECATED RDW RBC AUTO: 42.8 FL (ref 37–54)
EOSINOPHIL # BLD AUTO: 0.23 10*3/MM3 (ref 0–0.4)
EOSINOPHIL NFR BLD AUTO: 2.9 % (ref 0.3–6.2)
ERYTHROCYTE [DISTWIDTH] IN BLOOD BY AUTOMATED COUNT: 13 % (ref 12.3–15.4)
GFR SERPL CREATININE-BSD FRML MDRD: 92 ML/MIN/1.73
GLOBULIN UR ELPH-MCNC: 3.2 GM/DL
GLUCOSE SERPL-MCNC: 101 MG/DL (ref 65–99)
HCT VFR BLD AUTO: 41.2 % (ref 34–46.6)
HDLC SERPL-MCNC: 73 MG/DL (ref 40–60)
HGB BLD-MCNC: 13.8 G/DL (ref 12–15.9)
IMM GRANULOCYTES # BLD AUTO: 0.02 10*3/MM3 (ref 0–0.05)
IMM GRANULOCYTES NFR BLD AUTO: 0.3 % (ref 0–0.5)
LDLC SERPL CALC-MCNC: 107 MG/DL (ref 0–100)
LDLC/HDLC SERPL: 1.45 {RATIO}
LYMPHOCYTES # BLD AUTO: 3.41 10*3/MM3 (ref 0.7–3.1)
LYMPHOCYTES NFR BLD AUTO: 43.7 % (ref 19.6–45.3)
MCH RBC QN AUTO: 30.1 PG (ref 26.6–33)
MCHC RBC AUTO-ENTMCNC: 33.5 G/DL (ref 31.5–35.7)
MCV RBC AUTO: 90 FL (ref 79–97)
MONOCYTES # BLD AUTO: 0.49 10*3/MM3 (ref 0.1–0.9)
MONOCYTES NFR BLD AUTO: 6.3 % (ref 5–12)
NEUTROPHILS NFR BLD AUTO: 3.59 10*3/MM3 (ref 1.7–7)
NEUTROPHILS NFR BLD AUTO: 46 % (ref 42.7–76)
NRBC BLD AUTO-RTO: 0 /100 WBC (ref 0–0.2)
PLATELET # BLD AUTO: 340 10*3/MM3 (ref 140–450)
PMV BLD AUTO: 9.2 FL (ref 6–12)
POTASSIUM SERPL-SCNC: 3.9 MMOL/L (ref 3.5–5.2)
PROT SERPL-MCNC: 7.5 G/DL (ref 6–8.5)
RBC # BLD AUTO: 4.58 10*6/MM3 (ref 3.77–5.28)
SODIUM SERPL-SCNC: 139 MMOL/L (ref 136–145)
TRIGL SERPL-MCNC: 59 MG/DL (ref 0–150)
VLDLC SERPL-MCNC: 11 MG/DL (ref 5–40)
WBC # BLD AUTO: 7.8 10*3/MM3 (ref 3.4–10.8)

## 2020-10-26 PROCEDURE — 80053 COMPREHEN METABOLIC PANEL: CPT | Performed by: FAMILY MEDICINE

## 2020-10-26 PROCEDURE — 99214 OFFICE O/P EST MOD 30 MIN: CPT | Performed by: FAMILY MEDICINE

## 2020-10-26 PROCEDURE — 71046 X-RAY EXAM CHEST 2 VIEWS: CPT

## 2020-10-26 PROCEDURE — 85025 COMPLETE CBC W/AUTO DIFF WBC: CPT | Performed by: FAMILY MEDICINE

## 2020-10-26 PROCEDURE — 90471 IMMUNIZATION ADMIN: CPT | Performed by: FAMILY MEDICINE

## 2020-10-26 PROCEDURE — 99396 PREV VISIT EST AGE 40-64: CPT | Performed by: FAMILY MEDICINE

## 2020-10-26 PROCEDURE — 80061 LIPID PANEL: CPT | Performed by: FAMILY MEDICINE

## 2020-10-26 PROCEDURE — 90686 IIV4 VACC NO PRSV 0.5 ML IM: CPT | Performed by: FAMILY MEDICINE

## 2020-10-26 PROCEDURE — 36415 COLL VENOUS BLD VENIPUNCTURE: CPT | Performed by: FAMILY MEDICINE

## 2020-10-26 RX ORDER — HYDROCHLOROTHIAZIDE 25 MG/1
25 TABLET ORAL DAILY
Qty: 90 TABLET | Refills: 1 | Status: CANCELLED | OUTPATIENT
Start: 2020-10-26

## 2020-10-26 RX ORDER — DEXAMETHASONE 4 MG/1
1 TABLET ORAL
Qty: 1 INHALER | Refills: 2 | Status: SHIPPED | OUTPATIENT
Start: 2020-10-26 | End: 2020-10-27 | Stop reason: SDUPTHER

## 2020-10-26 RX ORDER — HYDROCHLOROTHIAZIDE 25 MG/1
25 TABLET ORAL DAILY
Qty: 90 TABLET | Refills: 1 | Status: SHIPPED | OUTPATIENT
Start: 2020-10-26 | End: 2021-04-06

## 2020-10-26 RX ORDER — DIPHENOXYLATE HYDROCHLORIDE AND ATROPINE SULFATE 2.5; .025 MG/1; MG/1
TABLET ORAL DAILY
COMMUNITY

## 2020-10-26 NOTE — PROGRESS NOTES
Subjective   Stacy Salomon is a 54 y.o. female.     Chief Complaint   Patient presents with   • Annual Exam   • Cough     x 9 mo        History of Present Illness     Here for annual wellness visit.  Rare alcohol use.  She is exercising a few days a week which she states she can exercise more.  She is up-to-date with her gynecologist.  She had a mammogram and Pap smear later this year.  Colonoscopy has been ordered.  She is arranging to get it scheduled.    Depression symptoms.  For a number of years since her   in 2017.  She states she has an ongoing grief issues and depression-like symptoms.  She states is more of a nuisance than a problem.  She was on medication number of years ago.  She thinks it was Wellbutrin.  She does not want medicine now.  She is not going to a therapist.  She states she can exercise more.  She states she is not been doing her mindfulness meditation as much as she used to.  She feels sad and depressed some days.  She has no trouble with sleep.  She has no suicidal ideation.  She has some decreased interest in pleasurable activities.    Cough.  Since influenza was diagnosed in March of this year.  The cough comes and goes.  Seems relieved with albuterol inhaler.  She has no shortness of breath.  She does not feel wheezing but the cough is improved as above with the inhaler.  Never smoker.  She was exposed to secondhand smoke when she was a child.    Hypertension follow up. Doing well with current medication which she is taking as directed. No known high or low blood pressure episodes. No cardiovascular or neurological symptoms. Today's BP: 125/81 .      Social History     Tobacco Use   • Smoking status: Never Smoker   • Smokeless tobacco: Never Used   Substance Use Topics   • Alcohol use: Yes     Comment: 2 glasses of wine/ month.   2 beers / month.  1 cocktail/mon   • Drug use: No     Immunization History   Administered Date(s) Administered   • Hepatitis A 10/23/2018, 2019  "  • Tdap 05/14/2011   • flucelvax quad pfs =>4 YRS 10/23/2018, 10/24/2019     Family History   Problem Relation Age of Onset   • No Known Problems Mother    • Heart disease Father    • Hyperlipidemia Father    • Arthritis Maternal Grandmother    • COPD Maternal Grandmother    • Cancer Paternal Aunt          The following portions of the patient's history were reviewed and updated as appropriate: allergies, current medications, past family history, past medical history, past social history, past surgical history and problem list.          Review of Systems   Constitutional: Negative.    HENT: Negative.    Eyes: Negative.    Respiratory: Positive for cough. Negative for shortness of breath.    Cardiovascular: Negative.    Gastrointestinal: Negative.  Negative for blood in stool.        No trouble swallowing.  No severe GERD symptoms.   Endocrine: Negative.    Genitourinary: Negative.  Negative for hematuria.   Musculoskeletal: Negative.    Skin: Negative.    Psychiatric/Behavioral: Positive for dysphoric mood.   All other systems reviewed and are negative.      Objective   Blood pressure 125/81, pulse 69, temperature 97.3 °F (36.3 °C), temperature source Temporal, height 160 cm (62.99\"), weight 101 kg (222 lb 8 oz), SpO2 95 %.  Physical Exam  Constitutional:       Appearance: Normal appearance.   HENT:      Head: Atraumatic.   Eyes:      Conjunctiva/sclera: Conjunctivae normal.   Neck:      Musculoskeletal: Normal range of motion and neck supple. No muscular tenderness.   Cardiovascular:      Rate and Rhythm: Normal rate and regular rhythm.      Pulses: Normal pulses.      Heart sounds: Normal heart sounds.   Pulmonary:      Effort: Pulmonary effort is normal.      Breath sounds: Normal breath sounds. No wheezing or rhonchi.   Abdominal:      General: Abdomen is flat. There is no distension.      Palpations: Abdomen is soft. There is no mass.      Tenderness: There is no abdominal tenderness.      Hernia: No hernia is " present.   Musculoskeletal: Normal range of motion.   Lymphadenopathy:      Cervical: No cervical adenopathy.   Skin:     General: Skin is warm and dry.      Findings: No rash.   Neurological:      General: No focal deficit present.      Mental Status: She is alert and oriented to person, place, and time.   Psychiatric:         Mood and Affect: Mood normal.         Behavior: Behavior normal.         Assessment/Plan   Diagnoses and all orders for this visit:    1. Health care maintenance (Primary)  -     CBC & Differential  -     Comprehensive Metabolic Panel  -     Lipid Panel    2. Need for vaccination  -     FluLaval Quad >6 Months (7507-4853)    3. Essential hypertension  -     CBC & Differential  -     Comprehensive Metabolic Panel  -     Lipid Panel    4. Cough  -     XR Chest PA & Lateral  -     CBC & Differential    5. Dysthymia    Other orders  -     fluticasone (Flovent HFA) 110 MCG/ACT inhaler; Inhale 1 puff 2 (Two) Times a Day.  Dispense: 1 inhaler; Refill: 2  -     hydroCHLOROthiazide (HYDRODIURIL) 25 MG tablet; Take 1 tablet by mouth Daily.  Dispense: 90 tablet; Refill: 1      Annual wellness visit.    Immunizations.  Flu vaccine today.    Hypertension.  She continues to monitor blood pressure at home.  And refilling the hydrochlorothiazide.  She is not on an ACE inhibitor.    Cough.  Likely post viral cough with some reactive asthma symptoms.  Continue albuterol.  Flovent 110 mcg inhaler 1 puff twice a day.  Chest x-ray today.  If not improving to consider pulmonary consultation.  She will let us know.    Dysthymia.  Borderline major depression.  Did offer medication but patient wants to wait.  She is going to increase her exercise.  She is going to get back to her therapist.  She is going to restart her mindfulness meditation.  I will see her back in 6 months sooner as needed.    Colon cancer screening.  The colonoscopy has been ordered.    Breast cancer screening and Pap smears through her  gynecologist.  Mammogram is up-to-date and next one is coming up this fall.    Preventative health practices discussed.

## 2020-10-27 RX ORDER — DEXAMETHASONE 4 MG/1
1 TABLET ORAL
Qty: 1 INHALER | Refills: 0 | Status: SHIPPED | OUTPATIENT
Start: 2020-10-27 | End: 2022-09-03 | Stop reason: SDUPTHER

## 2020-10-27 NOTE — PROGRESS NOTES
"Lab work overall looks good.  The HDL \"good\" cholesterol is higher, which is favorable.  The blood count is normal."

## 2020-11-05 ENCOUNTER — PREP FOR SURGERY (OUTPATIENT)
Dept: OTHER | Facility: HOSPITAL | Age: 55
End: 2020-11-05

## 2020-11-05 DIAGNOSIS — Z12.11 SCREEN FOR COLON CANCER: Primary | ICD-10-CM

## 2020-11-06 PROBLEM — Z12.11 SCREEN FOR COLON CANCER: Status: ACTIVE | Noted: 2020-11-06

## 2020-11-23 ENCOUNTER — TRANSCRIBE ORDERS (OUTPATIENT)
Dept: SLEEP MEDICINE | Facility: HOSPITAL | Age: 55
End: 2020-11-23

## 2020-11-23 DIAGNOSIS — Z01.818 OTHER SPECIFIED PRE-OPERATIVE EXAMINATION: Primary | ICD-10-CM

## 2020-11-24 ENCOUNTER — TELEMEDICINE (OUTPATIENT)
Dept: FAMILY MEDICINE CLINIC | Facility: CLINIC | Age: 55
End: 2020-11-24

## 2020-11-24 DIAGNOSIS — U07.1 COVID-19: Primary | ICD-10-CM

## 2020-11-24 DIAGNOSIS — J06.9 VIRAL URI: ICD-10-CM

## 2020-11-24 PROCEDURE — 99213 OFFICE O/P EST LOW 20 MIN: CPT | Performed by: FAMILY MEDICINE

## 2020-11-24 NOTE — PROGRESS NOTES
Subjective   Stacy Salomon is a 54 y.o. female.     Chief Complaint   Patient presents with   • Covid-19 Home Monitoring Video Visit        History of Present Illness    Coronavirus pandemic telehealth visit.  Good audio and video connection.  Patient gave informed consent through the [employer] check in process.    COVID-19 home monitoring.  She was at her nephew's house earlier last week.  Nephew got sick a few days later.  So the patient.  She tested positive a few days ago for COVID-19.  She was in bed over the weekend with severe muscle aches.  He has had congestion and cough.  Some decreased sense of smell but not taste.  She has had some diarrhea.  She does not feel short of breath when she is very tired.  No fever in recent days.  Overall she feels okay.  No trouble with brain fog.  She had previous cough after influenza.  She was taking fluticasone steroid and pulmonary inhaler.  She stopped it recently when she started to get ill.      The following portions of the patient's history were reviewed and updated as appropriate: allergies, current medications, past family history, past medical history, past social history, past surgical history and problem list.          Review of Systems   Constitutional: Positive for chills, fatigue and fever.   HENT: Positive for congestion.    Respiratory: Positive for cough. Negative for shortness of breath.    Musculoskeletal: Positive for myalgias.       Objective   There were no vitals taken for this visit.  Physical Exam  Constitutional:       Comments: Patient appears nontoxic.   HENT:      Head: Atraumatic.   Neck:      Musculoskeletal: Normal range of motion.   Pulmonary:      Effort: Pulmonary effort is normal. No respiratory distress.      Comments: She can easily talk in complete sentences without having to stop to breathe.  No visible tachypnea.  Good color.  Skin:     Coloration: Skin is not pale.   Neurological:      Mental Status: She is alert and oriented to  person, place, and time.      Coordination: Coordination normal.   Psychiatric:         Behavior: Behavior normal.         Assessment/Plan   Diagnoses and all orders for this visit:    1. COVID-19 (Primary)    2. Viral URI      COVID-19 URI syndrome.  No respiratory distress.  At this point stable.  She understands go directly to the emergency room a very severe symptoms such as severe shortness of breath, high fever or other severe symptoms.  I want her to hold off on the Flovent inhaler at this time.  When she starts to feel better and the cough persist, we can consider restarting it.  Instructions given.  She will call with questions or concerns.    Duration of visit approximately 15 minutes.

## 2020-12-15 ENCOUNTER — LAB (OUTPATIENT)
Dept: LAB | Facility: HOSPITAL | Age: 55
End: 2020-12-15

## 2020-12-15 ENCOUNTER — APPOINTMENT (OUTPATIENT)
Dept: WOMENS IMAGING | Facility: HOSPITAL | Age: 55
End: 2020-12-15

## 2020-12-15 DIAGNOSIS — Z01.818 OTHER SPECIFIED PRE-OPERATIVE EXAMINATION: ICD-10-CM

## 2020-12-15 PROCEDURE — U0004 COV-19 TEST NON-CDC HGH THRU: HCPCS

## 2020-12-15 PROCEDURE — C9803 HOPD COVID-19 SPEC COLLECT: HCPCS

## 2020-12-15 PROCEDURE — 77063 BREAST TOMOSYNTHESIS BI: CPT | Performed by: RADIOLOGY

## 2020-12-15 PROCEDURE — 77067 SCR MAMMO BI INCL CAD: CPT | Performed by: RADIOLOGY

## 2020-12-16 ENCOUNTER — TELEPHONE (OUTPATIENT)
Dept: SURGERY | Facility: CLINIC | Age: 55
End: 2020-12-16

## 2020-12-16 LAB — SARS-COV-2 RNA RESP QL NAA+PROBE: DETECTED

## 2020-12-17 ENCOUNTER — HOSPITAL ENCOUNTER (OUTPATIENT)
Facility: HOSPITAL | Age: 55
Setting detail: HOSPITAL OUTPATIENT SURGERY
Discharge: HOME | End: 2020-12-17
Attending: SURGERY | Admitting: SURGERY

## 2020-12-17 ENCOUNTER — ANESTHESIA EVENT (OUTPATIENT)
Dept: GASTROENTEROLOGY | Facility: HOSPITAL | Age: 55
End: 2020-12-17

## 2020-12-17 ENCOUNTER — ANESTHESIA (OUTPATIENT)
Dept: GASTROENTEROLOGY | Facility: HOSPITAL | Age: 55
End: 2020-12-17

## 2020-12-17 VITALS
BODY MASS INDEX: 37.81 KG/M2 | DIASTOLIC BLOOD PRESSURE: 84 MMHG | WEIGHT: 213.4 LBS | HEART RATE: 75 BPM | RESPIRATION RATE: 18 BRPM | OXYGEN SATURATION: 99 % | SYSTOLIC BLOOD PRESSURE: 140 MMHG | HEIGHT: 63 IN | TEMPERATURE: 97.3 F

## 2020-12-17 PROCEDURE — S0260 H&P FOR SURGERY: HCPCS | Performed by: SURGERY

## 2020-12-17 PROCEDURE — 25010000002 PROPOFOL 10 MG/ML EMULSION: Performed by: ANESTHESIOLOGY

## 2020-12-17 PROCEDURE — 45378 DIAGNOSTIC COLONOSCOPY: CPT | Performed by: SURGERY

## 2020-12-17 RX ORDER — PROPOFOL 10 MG/ML
VIAL (ML) INTRAVENOUS AS NEEDED
Status: DISCONTINUED | OUTPATIENT
Start: 2020-12-17 | End: 2020-12-17 | Stop reason: SURG

## 2020-12-17 RX ORDER — LIDOCAINE HYDROCHLORIDE 20 MG/ML
INJECTION, SOLUTION INFILTRATION; PERINEURAL AS NEEDED
Status: DISCONTINUED | OUTPATIENT
Start: 2020-12-17 | End: 2020-12-17 | Stop reason: SURG

## 2020-12-17 RX ORDER — SODIUM CHLORIDE, SODIUM LACTATE, POTASSIUM CHLORIDE, CALCIUM CHLORIDE 600; 310; 30; 20 MG/100ML; MG/100ML; MG/100ML; MG/100ML
INJECTION, SOLUTION INTRAVENOUS CONTINUOUS PRN
Status: DISCONTINUED | OUTPATIENT
Start: 2020-12-17 | End: 2020-12-17 | Stop reason: SURG

## 2020-12-17 RX ADMIN — LIDOCAINE HYDROCHLORIDE 100 MG: 20 INJECTION, SOLUTION INFILTRATION; PERINEURAL at 15:27

## 2020-12-17 RX ADMIN — SODIUM CHLORIDE, POTASSIUM CHLORIDE, SODIUM LACTATE AND CALCIUM CHLORIDE: 600; 310; 30; 20 INJECTION, SOLUTION INTRAVENOUS at 15:27

## 2020-12-17 RX ADMIN — PROPOFOL 320 MG: 10 INJECTION, EMULSION INTRAVENOUS at 15:28

## 2020-12-17 NOTE — DISCHARGE INSTRUCTIONS
For the next 24 hours patient needs to be with a responsible adult.    For 24 hours DO NOT drive, operate machinery, appliances, drink alcohol, make important decisions or sign legal documents.    Start with a light or bland diet if you are feeling sick to your stomach otherwise advance to regular diet as tolerated.    Follow recommendations on procedure report if provided by your doctor.    Call Dr Rasheed for problems [phone]    Problems may include but not limited to: large amounts of bleeding, trouble breathing, repeated vomiting, severe unrelieved pain, fever or chills.

## 2020-12-17 NOTE — ADDENDUM NOTE
Addendum  created 12/17/20 1548 by Arnoldo Perez MD    Review and Sign - Ready for Procedure, Review and Sign - Signed

## 2020-12-17 NOTE — ANESTHESIA PREPROCEDURE EVALUATION
Anesthesia Evaluation     Patient summary reviewed and Nursing notes reviewed   NPO Solid Status: > 8 hours  NPO Liquid Status: > 4 hours           Airway   Mallampati: II  TM distance: >3 FB  Neck ROM: full  no difficulty expected  Dental - normal exam     Pulmonary - normal exam   Cardiovascular - normal exam    (+) hypertension, valvular problems/murmurs,       Neuro/Psych  (+) psychiatric history Anxiety,     GI/Hepatic/Renal/Endo      Musculoskeletal     Abdominal  - normal exam   Substance History      OB/GYN          Other                        Anesthesia Plan    ASA 3     MAC       Anesthetic plan, all risks, benefits, and alternatives have been provided, discussed and informed consent has been obtained with: patient.

## 2020-12-17 NOTE — ANESTHESIA POSTPROCEDURE EVALUATION
"Patient: Stacy Salomon    Procedure Summary     Date: 12/17/20 [facility] / [facility]    Anesthesia Start: 1527 Anesthesia Stop: 1547    Procedure: COLONOSCOPY TO CECUM (N/A ) Diagnosis:     Surgeon: Jesus Rasheed MD Provider: Arnoldo Perez MD    Anesthesia Type: MAC ASA Status: 3          Anesthesia Type: MAC    Vitals  No vitals data found for the desired time range.          Post Anesthesia Care and Evaluation    Patient location during evaluation: bedside  Patient participation: complete - patient participated  Level of consciousness: awake and alert  Pain management: adequate  Airway patency: patent  Anesthetic complications: No anesthetic complications    Cardiovascular status: acceptable  Respiratory status: acceptable  Hydration status: acceptable    Comments: /84 (BP Location: Left arm, Patient Position: Sitting)   Pulse 68   Temp 36.3 °C (97.3 °F)   Resp 16   Ht 160 cm (63\")   Wt 96.8 kg (213 lb 6.4 oz)   [facility] 12/17/2010   SpO2 95%   BMI 37.80 kg/m²       "

## 2020-12-17 NOTE — H&P
HPI: Screening    PMH, PSH, MEDS AND ALLERGIES reviewed and reconciled with EPIC    PHYSICAL EXAM:  -  Constitutional:  no acute distress  -  Respiratory:  normal inspiratory effort  -  Cardiovascular: regular rate  -  Gastrointestinal: Soft    ASSESSMENT/PLAN:    Colonoscopy    Jesus Rasheed M.D.

## 2020-12-17 NOTE — OP NOTE
PREOPERATIVE DIAGNOSIS:  Screening    POSTOPERATIVE DIAGNOSIS AND FINDINGS:  Normal mucosa  Diverticulosis    PROCEDURE:  Colonoscopy to cecum     SURGEON:  Jesus Rasheed MD    ANESTHESIA:  MAC    SPECIMEN(S):  none    DESCRIPTION:  In decubitus position digital rectal exam was normal. Colonoscope inserted under direct visualization of lumen to cecum confirmed by visualization of ileocecal valve and appendiceal orifice.    Scope slowly withdrawn circumferentially examining all mucosal surfaces.    Quality of bowel preparation good.    There were no mucosal abnormalities.     Minimal sigmoid diverticulosis seen.    Tolerated well.    RECOMMENDATION FOR FUTURE SURVEILLANCE:  10 years    Jesus Rasheed M.D.

## 2021-03-24 ENCOUNTER — BULK ORDERING (OUTPATIENT)
Dept: CASE MANAGEMENT | Facility: OTHER | Age: 56
End: 2021-03-24

## 2021-03-24 DIAGNOSIS — Z23 IMMUNIZATION DUE: ICD-10-CM

## 2021-03-30 ENCOUNTER — IMMUNIZATION (OUTPATIENT)
Dept: VACCINE CLINIC | Facility: HOSPITAL | Age: 56
End: 2021-03-30

## 2021-03-30 DIAGNOSIS — Z23 IMMUNIZATION DUE: ICD-10-CM

## 2021-03-30 PROCEDURE — 91300 HC SARSCOV02 VAC 30MCG/0.3ML IM: CPT | Performed by: INTERNAL MEDICINE

## 2021-03-30 PROCEDURE — 0001A: CPT | Performed by: INTERNAL MEDICINE

## 2021-04-06 RX ORDER — HYDROCHLOROTHIAZIDE 25 MG/1
TABLET ORAL
Qty: 90 TABLET | Refills: 1 | Status: SHIPPED | OUTPATIENT
Start: 2021-04-06 | End: 2021-10-04

## 2021-04-19 DIAGNOSIS — I10 ESSENTIAL HYPERTENSION: ICD-10-CM

## 2021-04-19 DIAGNOSIS — Z00.00 HEALTHCARE MAINTENANCE: Primary | ICD-10-CM

## 2021-04-20 ENCOUNTER — IMMUNIZATION (OUTPATIENT)
Dept: VACCINE CLINIC | Facility: HOSPITAL | Age: 56
End: 2021-04-20

## 2021-04-20 PROCEDURE — 0002A: CPT | Performed by: INTERNAL MEDICINE

## 2021-04-20 PROCEDURE — 91300 HC SARSCOV02 VAC 30MCG/0.3ML IM: CPT | Performed by: INTERNAL MEDICINE

## 2021-04-26 ENCOUNTER — OFFICE VISIT (OUTPATIENT)
Dept: FAMILY MEDICINE CLINIC | Facility: CLINIC | Age: 56
End: 2021-04-26

## 2021-04-26 VITALS
HEIGHT: 63 IN | OXYGEN SATURATION: 95 % | SYSTOLIC BLOOD PRESSURE: 128 MMHG | BODY MASS INDEX: 39.11 KG/M2 | HEART RATE: 74 BPM | DIASTOLIC BLOOD PRESSURE: 72 MMHG | TEMPERATURE: 97.1 F | WEIGHT: 220.7 LBS

## 2021-04-26 DIAGNOSIS — Z11.59 NEED FOR HEPATITIS C SCREENING TEST: ICD-10-CM

## 2021-04-26 DIAGNOSIS — I10 ESSENTIAL HYPERTENSION: Primary | ICD-10-CM

## 2021-04-26 PROCEDURE — 99213 OFFICE O/P EST LOW 20 MIN: CPT | Performed by: FAMILY MEDICINE

## 2021-04-26 NOTE — PATIENT INSTRUCTIONS
Please continue to check your blood pressure on a regular basis.  Record the numbers and then send me the readings in about 3 or 4 weeks via the [employer] service.  You can either type in the numbers or send a PDF attachment.

## 2021-04-26 NOTE — PROGRESS NOTES
"Chief Complaint  Hypertension (follow up )    Subjective          Stacy Salomon presents to [facility]  History of Present Illness     Follow-up hypertension.  She was checking her blood pressure good amount last year.  Those numbers were mostly normal.  Her blood pressure today at first was 120/72.  Repeat is 120/90.  She has no cardiovascular symptoms.  She is otherwise doing well.  She no longer has the dysthymic symptoms she had last visit.  Did have COVID-19 late last year, no residual symptoms.  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  She is down a couple of pounds since last visit.  She has been exercising much more.    Objective   Vital Signs:   /72   Pulse 74   Temp 97.1 °F (36.2 °C) (Temporal)   Ht 160 cm (62.99\")   Wt 100 kg (220 lb 11.2 oz)   SpO2 95%   BMI 39.11 kg/m²     Physical Exam  Vitals and nursing note reviewed.   Constitutional:       General: She is not in acute distress.     Appearance: She is well-developed.   Neck:      Thyroid: No thyromegaly.   Cardiovascular:      Rate and Rhythm: Normal rate.   Pulmonary:      Effort: Pulmonary effort is normal.   Skin:     General: Skin is warm and dry.   Psychiatric:         Behavior: Behavior normal.         Thought Content: Thought content normal.         Judgment: Judgment normal.        Result Review :   The following data was reviewed by: Parrish Hawkins MD on 04/26/2021:  Common labs    Common Labsle 10/26/20 10/26/20 10/26/20    1022 1022 1022   Glucose  101 (A)    BUN  18    Creatinine  0.67    eGFR Non African Am  92    Sodium  139    Potassium  3.9    Chloride  103    Calcium  9.5    Albumin  4.30    Total Bilirubin  0.4    Alkaline Phosphatase  85    AST (SGOT)  16    ALT (SGPT)  20    WBC 7.80     Hemoglobin 13.8     Hematocrit 41.2     Platelets 340     Total Cholesterol   191   Triglycerides   59   HDL Cholesterol   73 (A)   LDL Cholesterol    107 (A)   (A) Abnormal " value                      Assessment and Plan    Diagnoses and all orders for this visit:    1. Essential hypertension (Primary)  -     Comprehensive Metabolic Panel  -     Lipid Panel    2. Need for hepatitis C screening test  -     Hepatitis C Antibody      Hypertension.  Probably well controlled but we may need more numbers.  She is going to be checking her blood pressure at home and if running high she will send us numbers.  Otherwise I will see her back in 6 months for recheck with lab work prior.  Also rechecking CMP and lipid panel today along with routine hepatitis C test.      Follow Up   No follow-ups on file.  Patient was given instructions and counseling regarding her condition or for health maintenance advice. Please see specific information pulled into the AVS if appropriate.

## 2021-04-27 LAB
ALBUMIN SERPL-MCNC: 4.5 G/DL (ref 3.5–5.2)
ALBUMIN/GLOB SERPL: 1.6 G/DL
ALP SERPL-CCNC: 92 U/L (ref 39–117)
ALT SERPL-CCNC: 23 U/L (ref 1–33)
AST SERPL-CCNC: 14 U/L (ref 1–32)
BILIRUB SERPL-MCNC: 0.4 MG/DL (ref 0–1.2)
BUN SERPL-MCNC: 17 MG/DL (ref 6–20)
BUN/CREAT SERPL: 22.7 (ref 7–25)
CALCIUM SERPL-MCNC: 10.2 MG/DL (ref 8.6–10.5)
CHLORIDE SERPL-SCNC: 101 MMOL/L (ref 98–107)
CHOLEST SERPL-MCNC: 184 MG/DL (ref 0–200)
CO2 SERPL-SCNC: 29.8 MMOL/L (ref 22–29)
CREAT SERPL-MCNC: 0.75 MG/DL (ref 0.57–1)
GLOBULIN SER CALC-MCNC: 2.8 GM/DL
GLUCOSE SERPL-MCNC: 97 MG/DL (ref 65–99)
HCV AB S/CO SERPL IA: <0.1 S/CO RATIO (ref 0–0.9)
HDLC SERPL-MCNC: 74 MG/DL (ref 40–60)
LDLC SERPL CALC-MCNC: 100 MG/DL (ref 0–100)
POTASSIUM SERPL-SCNC: 4.4 MMOL/L (ref 3.5–5.2)
PROT SERPL-MCNC: 7.3 G/DL (ref 6–8.5)
SODIUM SERPL-SCNC: 142 MMOL/L (ref 136–145)
TRIGL SERPL-MCNC: 52 MG/DL (ref 0–150)
VLDLC SERPL CALC-MCNC: 10 MG/DL (ref 5–40)

## 2021-10-04 RX ORDER — HYDROCHLOROTHIAZIDE 25 MG/1
TABLET ORAL
Qty: 90 TABLET | Refills: 1 | Status: SHIPPED | OUTPATIENT
Start: 2021-10-04 | End: 2022-04-04

## 2021-10-04 NOTE — TELEPHONE ENCOUNTER
Rx Refill Note  Requested Prescriptions     Pending Prescriptions Disp Refills   • hydroCHLOROthiazide (HYDRODIURIL) 25 MG tablet [Pharmacy Med Name: HYDROCHLOROTHIAZIDE TABS 25MG] 90 tablet 3     Sig: TAKE 1 TABLET DAILY      Last office visit with prescribing clinician: 4/26/2021      Next office visit with prescribing clinician: 10/27/2021            Talia Jesus MA  10/04/21, 07:53 EDT

## 2021-10-06 DIAGNOSIS — R05.3 PERSISTENT COUGH: Primary | ICD-10-CM

## 2021-10-21 ENCOUNTER — HOSPITAL ENCOUNTER (OUTPATIENT)
Dept: GENERAL RADIOLOGY | Facility: HOSPITAL | Age: 56
Discharge: HOME | End: 2021-10-21
Admitting: FAMILY MEDICINE

## 2021-10-21 DIAGNOSIS — R05.3 PERSISTENT COUGH: ICD-10-CM

## 2021-10-21 PROCEDURE — 71046 X-RAY EXAM CHEST 2 VIEWS: CPT

## 2021-10-22 LAB
ALBUMIN SERPL-MCNC: 4.5 G/DL (ref 3.8–4.9)
ALBUMIN/GLOB SERPL: 1.6 {RATIO} (ref 1.2–2.2)
ALP SERPL-CCNC: 92 IU/L (ref 44–121)
ALT SERPL-CCNC: 18 IU/L (ref 0–32)
AST SERPL-CCNC: 13 IU/L (ref 0–40)
BASOPHILS # BLD AUTO: 0.1 X10E3/UL (ref 0–0.2)
BASOPHILS NFR BLD AUTO: 1 %
BILIRUB SERPL-MCNC: 0.3 MG/DL (ref 0–1.2)
BUN SERPL-MCNC: 20 MG/DL (ref 6–24)
BUN/CREAT SERPL: 29 (ref 9–23)
CALCIUM SERPL-MCNC: 9.5 MG/DL (ref 8.7–10.2)
CHLORIDE SERPL-SCNC: 103 MMOL/L (ref 96–106)
CHOLEST SERPL-MCNC: 185 MG/DL (ref 100–199)
CO2 SERPL-SCNC: 24 MMOL/L (ref 20–29)
CREAT SERPL-MCNC: 0.7 MG/DL (ref 0.57–1)
EOSINOPHIL # BLD AUTO: 0.2 X10E3/UL (ref 0–0.4)
EOSINOPHIL NFR BLD AUTO: 2 %
ERYTHROCYTE [DISTWIDTH] IN BLOOD BY AUTOMATED COUNT: 12.9 % (ref 11.7–15.4)
GLOBULIN SER CALC-MCNC: 2.8 G/DL (ref 1.5–4.5)
GLUCOSE SERPL-MCNC: 101 MG/DL (ref 65–99)
HCT VFR BLD AUTO: 40.9 % (ref 34–46.6)
HDLC SERPL-MCNC: 68 MG/DL
HGB BLD-MCNC: 13.7 G/DL (ref 11.1–15.9)
IMM GRANULOCYTES # BLD AUTO: 0 X10E3/UL (ref 0–0.1)
IMM GRANULOCYTES NFR BLD AUTO: 0 %
LDLC SERPL CALC-MCNC: 107 MG/DL (ref 0–99)
LYMPHOCYTES # BLD AUTO: 3.9 X10E3/UL (ref 0.7–3.1)
LYMPHOCYTES NFR BLD AUTO: 44 %
MCH RBC QN AUTO: 29.8 PG (ref 26.6–33)
MCHC RBC AUTO-ENTMCNC: 33.5 G/DL (ref 31.5–35.7)
MCV RBC AUTO: 89 FL (ref 79–97)
MONOCYTES # BLD AUTO: 0.5 X10E3/UL (ref 0.1–0.9)
MONOCYTES NFR BLD AUTO: 6 %
NEUTROPHILS # BLD AUTO: 4.1 X10E3/UL (ref 1.4–7)
NEUTROPHILS NFR BLD AUTO: 47 %
PLATELET # BLD AUTO: 368 X10E3/UL (ref 150–450)
POTASSIUM SERPL-SCNC: 4.3 MMOL/L (ref 3.5–5.2)
PROT SERPL-MCNC: 7.3 G/DL (ref 6–8.5)
RBC # BLD AUTO: 4.59 X10E6/UL (ref 3.77–5.28)
SODIUM SERPL-SCNC: 141 MMOL/L (ref 134–144)
TRIGL SERPL-MCNC: 54 MG/DL (ref 0–149)
VLDLC SERPL CALC-MCNC: 10 MG/DL (ref 5–40)
WBC # BLD AUTO: 8.8 X10E3/UL (ref 3.4–10.8)

## 2021-10-27 ENCOUNTER — OFFICE VISIT (OUTPATIENT)
Dept: FAMILY MEDICINE CLINIC | Facility: CLINIC | Age: 56
End: 2021-10-27

## 2021-10-27 VITALS
SYSTOLIC BLOOD PRESSURE: 138 MMHG | OXYGEN SATURATION: 96 % | TEMPERATURE: 96.2 F | BODY MASS INDEX: 40.84 KG/M2 | RESPIRATION RATE: 16 BRPM | HEIGHT: 63 IN | HEART RATE: 83 BPM | DIASTOLIC BLOOD PRESSURE: 88 MMHG | WEIGHT: 230.5 LBS

## 2021-10-27 DIAGNOSIS — Z00.00 HEALTH CARE MAINTENANCE: Primary | ICD-10-CM

## 2021-10-27 DIAGNOSIS — Z23 NEED FOR VACCINATION: ICD-10-CM

## 2021-10-27 PROCEDURE — 99396 PREV VISIT EST AGE 40-64: CPT | Performed by: FAMILY MEDICINE

## 2021-10-27 PROCEDURE — 90471 IMMUNIZATION ADMIN: CPT | Performed by: FAMILY MEDICINE

## 2021-10-27 PROCEDURE — 90472 IMMUNIZATION ADMIN EACH ADD: CPT | Performed by: FAMILY MEDICINE

## 2021-10-27 PROCEDURE — 90686 IIV4 VACC NO PRSV 0.5 ML IM: CPT | Performed by: FAMILY MEDICINE

## 2021-10-27 PROCEDURE — 90715 TDAP VACCINE 7 YRS/> IM: CPT | Performed by: FAMILY MEDICINE

## 2021-10-27 RX ORDER — COLLAGENASE CLOSTRIDIUM HIST.
POWDER (EA) MISCELLANEOUS
COMMUNITY

## 2021-10-27 NOTE — PROGRESS NOTES
"Chief Complaint  Annual Exam    Subjective          Stacy Salomon presents to [facility]  History of Present Illness    Annual healthcare maintenance visit.  Non-smoker.  No heavy alcohol use.  Drinks alcohol very rarely.  She continues to have some ongoing cough issues.  Chest x-ray we did earlier this year was normal.  She not currently short of breath.  The fluticasone inhaler seems to help.  But she does not take it all the time.  She does have an allergist but she is not been to them in some time.  She is having no significant shortness of breath.  No exercise intolerance.  However she is not exercising much.  She is gained some weight.  Her blood pressure has remained stable.  Slightly elevated today.  She continues hydrochlorothiazide 25 mg with good reviewed review of systems otherwise negative.  Has a grandbaby coming due.  Last tetanus shot 2011 with questionable pertussis booster then.    Objective   Vital Signs:   /88   Pulse 83   Temp 96.2 °F (35.7 °C) (Temporal)   Resp 16   Ht 160 cm (62.99\")   Wt 105 kg (230 lb 8 oz)   SpO2 96%   BMI 40.84 kg/m²     Physical Exam  Vitals and nursing note reviewed.   Constitutional:       Appearance: She is well-developed. She is obese.   HENT:      Head: Normocephalic and atraumatic.      Right Ear: Tympanic membrane, ear canal and external ear normal.      Left Ear: Tympanic membrane, ear canal and external ear normal.      Nose: Nose normal.      Mouth/Throat:      Pharynx: No oropharyngeal exudate.   Eyes:      General: No scleral icterus.     Pupils: Pupils are equal, round, and reactive to light.   Neck:      Thyroid: No thyromegaly.   Cardiovascular:      Rate and Rhythm: Normal rate and regular rhythm.      Heart sounds: Normal heart sounds. No murmur heard.      Pulmonary:      Effort: Pulmonary effort is normal.      Breath sounds: Normal breath sounds. No wheezing.   Abdominal:      General: Bowel sounds are " normal. There is no distension.      Palpations: Abdomen is soft. There is no mass.      Tenderness: There is no abdominal tenderness.      Hernia: No hernia is present.   Musculoskeletal:         General: Normal range of motion.      Cervical back: Normal range of motion and neck supple.   Lymphadenopathy:      Cervical: No cervical adenopathy.   Skin:     General: Skin is warm and dry.      Findings: No rash.   Neurological:      Mental Status: She is alert and oriented to person, place, and time.      Motor: No abnormal muscle tone.   Psychiatric:         Behavior: Behavior normal.         Thought Content: Thought content normal.         Judgment: Judgment normal.        Result Review :   The following data was reviewed by: Parrish Hawkins MD on 10/27/2021:  Common labs    Common Labsle 4/26/21 4/26/21 10/21/21 10/21/21 10/21/21    0947 0947 0908 0908 0908   Glucose 97   101 (A)    BUN 17   20    Creatinine 0.75   0.70    eGFR Non  Am 81   99    eGFR African Am 98   114    Sodium 142   141    Potassium 4.4   4.3    Chloride 101   103    Calcium 10.2   9.5    Total Protein 7.3   7.3    Albumin 4.50   4.5    Total Bilirubin 0.4   0.3    Alkaline Phosphatase 92   92    AST (SGOT) 14   13    ALT (SGPT) 23   18    WBC   8.8     Hemoglobin   13.7     Hematocrit   40.9     Platelets   368     Total Cholesterol  184   185   Triglycerides  52   54   HDL Cholesterol  74 (A)   68   LDL Cholesterol   100   107 (A)   (A) Abnormal value       Comments are available for some flowsheets but are not being displayed.                     Assessment and Plan    Diagnoses and all orders for this visit:    1. Health care maintenance (Primary)    Other orders  -     Tdap Vaccine Greater Than or Equal To 8yo IM      Annual healthcare maintenance visit.    Immunizations.  Flu vaccine today.  Tdap today.  Covid booster soon.  Also recommend Shingrix vaccination this fall.    Breast cancer screening.  Mammography is scheduled for  tomorrow.    Colon cancer screening up to date.    Pap smear through gynecology.    Hypertension.  Overall well controlled.    Preventative health practices discussed in great detail including regular exercise which we talked about greatly today.  Her LDL cholesterol is up just slightly.  HDL is high but down somewhat.  Her fasting glucose is just minimally elevated.  LFTs and creatinine normal.  I will see her back in 1 year sooner as needed.  She states she is can start working on weight loss again.      Follow Up   No follow-ups on file.  Patient was given instructions and counseling regarding her condition or for health maintenance advice. Please see specific information pulled into the AVS if appropriate.

## 2021-10-28 ENCOUNTER — APPOINTMENT (OUTPATIENT)
Dept: WOMENS IMAGING | Facility: HOSPITAL | Age: 56
End: 2021-10-28

## 2021-10-28 PROCEDURE — 77063 BREAST TOMOSYNTHESIS BI: CPT | Performed by: RADIOLOGY

## 2021-10-28 PROCEDURE — 77067 SCR MAMMO BI INCL CAD: CPT | Performed by: RADIOLOGY

## 2021-12-10 ENCOUNTER — IMMUNIZATION (OUTPATIENT)
Dept: VACCINE CLINIC | Facility: HOSPITAL | Age: 56
End: 2021-12-10

## 2021-12-10 PROCEDURE — 91300 HC SARSCOV02 VAC 30MCG/0.3ML IM: CPT | Performed by: INTERNAL MEDICINE

## 2021-12-10 PROCEDURE — 0004A HC ADM SARSCOV2 30MCG/0.3ML BOOSTER: CPT | Performed by: INTERNAL MEDICINE

## 2022-02-16 ENCOUNTER — TRANSCRIBE ORDERS (OUTPATIENT)
Dept: ADMINISTRATIVE | Facility: HOSPITAL | Age: 57
End: 2022-02-16

## 2022-02-16 DIAGNOSIS — Z80.3 FAMILY HISTORY OF BREAST CANCER: Primary | ICD-10-CM

## 2022-02-24 ENCOUNTER — TELEMEDICINE (OUTPATIENT)
Dept: FAMILY MEDICINE CLINIC | Facility: CLINIC | Age: 57
End: 2022-02-24

## 2022-02-24 DIAGNOSIS — F33.0 MILD EPISODE OF RECURRENT MAJOR DEPRESSIVE DISORDER: Primary | ICD-10-CM

## 2022-02-24 PROCEDURE — 99213 OFFICE O/P EST LOW 20 MIN: CPT | Performed by: FAMILY MEDICINE

## 2022-02-24 RX ORDER — BUPROPION HYDROCHLORIDE 150 MG/1
150 TABLET ORAL DAILY
Qty: 90 TABLET | Refills: 1 | Status: SHIPPED | OUTPATIENT
Start: 2022-02-24 | End: 2022-02-25 | Stop reason: SDUPTHER

## 2022-02-24 NOTE — PROGRESS NOTES
Chief Complaint  Depression    Subjective          Stacy Salomon presents to [facility]  History of Present Illness     You have chosen to receive care through a telehealth visit.  Do you consent to use a video/audio connection for your medical care today? Yes    Patient has had depressed feelings.  Sadness.  Partially due to the winter.  Also the anniversary of her 's death 5 years ago was coming up.  She had trouble focusing.  She has been under some stressors with work.  Also some life event issues with her children, moving out of the house, etc.  She is sleeping well.  Essentially no alcohol use.  No suicidal ideation.  Feeling sad often.  Crying at times suddenly.  She was on bupropion many years ago and did well with that for similar symptomatology.  She has no history of seizures.      Objective   Vital Signs:   There were no vitals taken for this visit.    Physical Exam  Constitutional:       Comments: Appears in no acute distress.  Affect is slightly flat.        Result Review :                 Assessment and Plan    Diagnoses and all orders for this visit:    1. Mild episode of recurrent major depressive disorder (HCC) (Primary)    Other orders  -     buPROPion XL (Wellbutrin XL) 150 MG 24 hr tablet; Take 1 tablet by mouth Daily.  Dispense: 90 tablet; Refill: 1      Recurrent major depression.  Also some inattention.  Likely exacerbated by situational stressors and ongoing remote grief.  Counseling given today.  Recommending bupropion 1 or 50 mg extended release daily.  Take every other day for the first week if it causes stomach upset or irritability.  I'll see her back in a couple months as scheduled for follow-up.  She will call with concerns.    Duration of today's video visit 15 minutes      Follow Up   No follow-ups on file.  Patient was given instructions and counseling regarding her condition or for health maintenance advice. Please see specific information  pulled into the AVS if appropriate.

## 2022-02-25 RX ORDER — BUPROPION HYDROCHLORIDE 150 MG/1
150 TABLET ORAL DAILY
Qty: 90 TABLET | Refills: 1 | Status: SHIPPED | OUTPATIENT
Start: 2022-02-25 | End: 2022-03-07

## 2022-03-05 ENCOUNTER — APPOINTMENT (OUTPATIENT)
Dept: CARDIOLOGY | Facility: HOSPITAL | Age: 57
End: 2022-03-05

## 2022-03-05 ENCOUNTER — HOSPITAL ENCOUNTER (EMERGENCY)
Facility: HOSPITAL | Age: 57
Discharge: HOME | End: 2022-03-05
Attending: EMERGENCY MEDICINE | Admitting: EMERGENCY MEDICINE

## 2022-03-05 VITALS
BODY MASS INDEX: 38.98 KG/M2 | WEIGHT: 220 LBS | SYSTOLIC BLOOD PRESSURE: 133 MMHG | HEART RATE: 94 BPM | TEMPERATURE: 98.5 F | RESPIRATION RATE: 16 BRPM | DIASTOLIC BLOOD PRESSURE: 84 MMHG | HEIGHT: 63 IN | OXYGEN SATURATION: 95 %

## 2022-03-05 DIAGNOSIS — R01.1 HEART MURMUR: ICD-10-CM

## 2022-03-05 DIAGNOSIS — R55 NEAR SYNCOPE: Primary | ICD-10-CM

## 2022-03-05 LAB
ALBUMIN SERPL-MCNC: 4.2 G/DL (ref 3.5–5.2)
ALBUMIN/GLOB SERPL: 1.3 G/DL
ALP SERPL-CCNC: 96 U/L (ref 39–117)
ALT SERPL W P-5'-P-CCNC: 24 U/L (ref 1–33)
ANION GAP SERPL CALCULATED.3IONS-SCNC: 12.7 MMOL/L (ref 5–15)
AST SERPL-CCNC: 16 U/L (ref 1–32)
BASOPHILS # BLD AUTO: 0.06 10*3/MM3 (ref 0–0.2)
BASOPHILS NFR BLD AUTO: 0.5 % (ref 0–1.5)
BILIRUB SERPL-MCNC: 0.2 MG/DL (ref 0–1.2)
BUN SERPL-MCNC: 18 MG/DL (ref 6–20)
BUN/CREAT SERPL: 19.8 (ref 7–25)
CALCIUM SPEC-SCNC: 9.7 MG/DL (ref 8.6–10.5)
CHLORIDE SERPL-SCNC: 100 MMOL/L (ref 98–107)
CO2 SERPL-SCNC: 27.3 MMOL/L (ref 22–29)
CREAT SERPL-MCNC: 0.91 MG/DL (ref 0.57–1)
D DIMER PPP FEU-MCNC: 0.32 MCGFEU/ML (ref 0–0.49)
DEPRECATED RDW RBC AUTO: 42.2 FL (ref 37–54)
EGFRCR SERPLBLD CKD-EPI 2021: 75.1 ML/MIN/1.73
EOSINOPHIL # BLD AUTO: 0.15 10*3/MM3 (ref 0–0.4)
EOSINOPHIL NFR BLD AUTO: 1.3 % (ref 0.3–6.2)
ERYTHROCYTE [DISTWIDTH] IN BLOOD BY AUTOMATED COUNT: 12.8 % (ref 12.3–15.4)
GLOBULIN UR ELPH-MCNC: 3.3 GM/DL
GLUCOSE SERPL-MCNC: 108 MG/DL (ref 65–99)
HCT VFR BLD AUTO: 41.3 % (ref 34–46.6)
HGB BLD-MCNC: 14.2 G/DL (ref 12–15.9)
IMM GRANULOCYTES # BLD AUTO: 0.03 10*3/MM3 (ref 0–0.05)
IMM GRANULOCYTES NFR BLD AUTO: 0.3 % (ref 0–0.5)
LYMPHOCYTES # BLD AUTO: 3.24 10*3/MM3 (ref 0.7–3.1)
LYMPHOCYTES NFR BLD AUTO: 29 % (ref 19.6–45.3)
MCH RBC QN AUTO: 30.7 PG (ref 26.6–33)
MCHC RBC AUTO-ENTMCNC: 34.4 G/DL (ref 31.5–35.7)
MCV RBC AUTO: 89.2 FL (ref 79–97)
MONOCYTES # BLD AUTO: 0.73 10*3/MM3 (ref 0.1–0.9)
MONOCYTES NFR BLD AUTO: 6.5 % (ref 5–12)
NEUTROPHILS NFR BLD AUTO: 6.98 10*3/MM3 (ref 1.7–7)
NEUTROPHILS NFR BLD AUTO: 62.4 % (ref 42.7–76)
NRBC BLD AUTO-RTO: 0 /100 WBC (ref 0–0.2)
PLATELET # BLD AUTO: 353 10*3/MM3 (ref 140–450)
PMV BLD AUTO: 9.3 FL (ref 6–12)
POTASSIUM SERPL-SCNC: 3.5 MMOL/L (ref 3.5–5.2)
PROT SERPL-MCNC: 7.5 G/DL (ref 6–8.5)
QT INTERVAL: 365 MS
RBC # BLD AUTO: 4.63 10*6/MM3 (ref 3.77–5.28)
SODIUM SERPL-SCNC: 140 MMOL/L (ref 136–145)
TROPONIN T SERPL-MCNC: <0.01 NG/ML (ref 0–0.03)
WBC NRBC COR # BLD: 11.19 10*3/MM3 (ref 3.4–10.8)

## 2022-03-05 PROCEDURE — 80053 COMPREHEN METABOLIC PANEL: CPT | Performed by: EMERGENCY MEDICINE

## 2022-03-05 PROCEDURE — 99284 EMERGENCY DEPT VISIT MOD MDM: CPT

## 2022-03-05 PROCEDURE — 93225 XTRNL ECG REC<48 HRS REC: CPT

## 2022-03-05 PROCEDURE — 84484 ASSAY OF TROPONIN QUANT: CPT | Performed by: EMERGENCY MEDICINE

## 2022-03-05 PROCEDURE — 93005 ELECTROCARDIOGRAM TRACING: CPT | Performed by: EMERGENCY MEDICINE

## 2022-03-05 PROCEDURE — 93010 ELECTROCARDIOGRAM REPORT: CPT | Performed by: INTERNAL MEDICINE

## 2022-03-05 PROCEDURE — 93226 XTRNL ECG REC<48 HR SCAN A/R: CPT

## 2022-03-05 PROCEDURE — 85379 FIBRIN DEGRADATION QUANT: CPT | Performed by: EMERGENCY MEDICINE

## 2022-03-05 PROCEDURE — 85025 COMPLETE CBC W/AUTO DIFF WBC: CPT | Performed by: EMERGENCY MEDICINE

## 2022-03-05 RX ORDER — SODIUM CHLORIDE 0.9 % (FLUSH) 0.9 %
10 SYRINGE (ML) INJECTION AS NEEDED
Status: DISCONTINUED | OUTPATIENT
Start: 2022-03-05 | End: 2022-03-05 | Stop reason: HOSPADM

## 2022-03-05 NOTE — ED TRIAGE NOTES
Pt reports she was at [employer] when she had a near syncopal episode.     Pt was wearing a mask during assessment.  This RN wore appropriate PPE

## 2022-03-05 NOTE — ED PROVIDER NOTES
EMERGENCY DEPARTMENT ENCOUNTER    Room Number:  17/17  Date of encounter:  3/5/2022  PCP: Parrish Hawkins MD  Historian: Patient, daughter at bedside    I used full protective equipment while examining this patient.  This includes face mask, gloves and protective eyewear.  I washed my hands before entering the room and immediately upon leaving the room      HPI:  Chief Complaint: Near syncope  A complete HPI/ROS/PMH/PSH/SH/FH are unobtainable due to: None    Context: Stacy Salomon is a 56 y.o. female who presents to the ED c/o near syncope.  Patient was shopping at [employer] and had been standing up for some time when she began to feel lightheaded and like she was going to blackout.  According to her smart watch her pulse was in the 130s.  She was unable to get a blood pressure check.  Symptoms lasted for 5 or 10 minutes and gradually resolved.  Currently she feels back to baseline.  She went to an urgent care center and was sent here for further evaluation.  Patient states she has not had similar symptoms in the past.  She states she has been in her usual health although did have one episode of diarrhea earlier this morning.      MEDICAL RECORD REVIEW  Patient does have a history of hypertension and anxiety.  She takes HydroDIURIL and Wellbutrin.  Primary care provider is Dr. Parrish Borrego.    PAST MEDICAL HISTORY  Active Ambulatory Problems     Diagnosis Date Noted   • Anxiety 03/02/2017   • Essential hypertension 10/23/2018   • Murmur, heart 04/23/2019   • Screen for colon cancer 11/06/2020     Resolved Ambulatory Problems     Diagnosis Date Noted   • No Resolved Ambulatory Problems     Past Medical History:   Diagnosis Date   • Hypertension          PAST SURGICAL HISTORY  Past Surgical History:   Procedure Laterality Date   • ADENOIDECTOMY     • APPENDECTOMY  1978   • CHOLECYSTECTOMY  1993   • COLONOSCOPY N/A 12/17/2020    Procedure: COLONOSCOPY TO CECUM;  Surgeon: Jesus Rasheed MD;  Location: [facility]  ENDOSCOPY;  Service: General;  Laterality: N/A;  PRE-SCREENING  POST- DIVERTICULOSIS   • GALLBLADDER SURGERY     • MOLE REMOVAL           FAMILY HISTORY  Family History   Problem Relation Age of Onset   • No Known Problems Mother    • Heart disease Father    • Hyperlipidemia Father    • Arthritis Maternal Grandmother    • COPD Maternal Grandmother    • Cancer Paternal Aunt          SOCIAL HISTORY  Social History     Socioeconomic History   • Marital status:    Tobacco Use   • Smoking status: Never Smoker   • Smokeless tobacco: Never Used   Substance and Sexual Activity   • Alcohol use: Yes     Comment: 2 glasses of wine/ month.   2 beers / month.  1 cocktail/mon   • Drug use: No   • Sexual activity: Never         ALLERGIES  Patient has no known allergies.       REVIEW OF SYSTEMS  Review of Systems   Constitutional: Negative.  Negative for fever.   HENT: Negative.  Negative for sore throat.    Eyes: Negative.    Respiratory: Negative.  Negative for cough.    Cardiovascular: Negative.  Negative for chest pain.   Gastrointestinal: Negative.    Genitourinary: Negative.  Negative for dysuria.   Musculoskeletal: Negative.  Negative for back pain.   Skin: Negative.  Negative for rash.   Neurological: Positive for light-headedness. Negative for headaches.   All other systems reviewed and are negative.          PHYSICAL EXAM    I have reviewed the triage vital signs and nursing notes.    ED Triage Vitals   Temp Heart Rate Resp BP SpO2   03/05/22 1653 03/05/22 1653 03/05/22 1653 03/05/22 1655 03/05/22 1653   98.5 °F (36.9 °C) (!) 122 16 (!) 182/101 93 %      Temp src Heart Rate Source Patient Position BP Location FiO2 (%)   -- -- -- -- --              Physical Exam  GENERAL: Alert female no obvious distress.  Triage vitals reviewed notable for reported pulse of 122 at triage.  Initial triage blood pressure 182/101.  HENT: nares patent  EYES: no scleral icterus  CV: Slightly tachycardic, mild systolic  murmur  RESPIRATORY: normal effort, clear to auscultation bilaterally-O2 saturations mid 90s on room air  ABDOMEN: soft, nontender to palpation  MUSCULOSKELETAL: no deformity-no significant swelling or tenderness to palpation  NEURO: Strength sensation and coordination are grossly intact.  Speech and mentation are unremarkable  SKIN: warm, dry      LAB RESULTS  Recent Results (from the past 24 hour(s))   Comprehensive Metabolic Panel    Collection Time: 03/05/22  5:14 PM    Specimen: Blood   Result Value Ref Range    Glucose 108 (H) 65 - 99 mg/dL    BUN 18 6 - 20 mg/dL    Creatinine 0.91 0.57 - 1.00 mg/dL    Sodium 140 136 - 145 mmol/L    Potassium 3.5 3.5 - 5.2 mmol/L    Chloride 100 98 - 107 mmol/L    CO2 27.3 22.0 - 29.0 mmol/L    Calcium 9.7 8.6 - 10.5 mg/dL    Total Protein 7.5 6.0 - 8.5 g/dL    Albumin 4.20 3.50 - 5.20 g/dL    ALT (SGPT) 24 1 - 33 U/L    AST (SGOT) 16 1 - 32 U/L    Alkaline Phosphatase 96 39 - 117 U/L    Total Bilirubin 0.2 0.0 - 1.2 mg/dL    Globulin 3.3 gm/dL    A/G Ratio 1.3 g/dL    BUN/Creatinine Ratio 19.8 7.0 - 25.0    Anion Gap 12.7 5.0 - 15.0 mmol/L    eGFR 75.1 >60.0 mL/min/1.73   D-dimer, Quantitative    Collection Time: 03/05/22  5:14 PM    Specimen: Blood   Result Value Ref Range    D-Dimer, Quantitative 0.32 0.00 - 0.49 MCGFEU/mL   Troponin    Collection Time: 03/05/22  5:14 PM    Specimen: Blood   Result Value Ref Range    Troponin T <0.010 0.000 - 0.030 ng/mL   CBC Auto Differential    Collection Time: 03/05/22  5:14 PM    Specimen: Blood   Result Value Ref Range    WBC 11.19 (H) 3.40 - 10.80 10*3/mm3    RBC 4.63 3.77 - 5.28 10*6/mm3    Hemoglobin 14.2 12.0 - 15.9 g/dL    Hematocrit 41.3 34.0 - 46.6 %    MCV 89.2 79.0 - 97.0 fL    MCH 30.7 26.6 - 33.0 pg    MCHC 34.4 31.5 - 35.7 g/dL    RDW 12.8 12.3 - 15.4 %    RDW-SD 42.2 37.0 - 54.0 fl    MPV 9.3 6.0 - 12.0 fL    Platelets 353 140 - 450 10*3/mm3    Neutrophil % 62.4 42.7 - 76.0 %    Lymphocyte % 29.0 19.6 - 45.3 %    Monocyte  % 6.5 5.0 - 12.0 %    Eosinophil % 1.3 0.3 - 6.2 %    Basophil % 0.5 0.0 - 1.5 %    Immature Grans % 0.3 0.0 - 0.5 %    Neutrophils, Absolute 6.98 1.70 - 7.00 10*3/mm3    Lymphocytes, Absolute 3.24 (H) 0.70 - 3.10 10*3/mm3    Monocytes, Absolute 0.73 0.10 - 0.90 10*3/mm3    Eosinophils, Absolute 0.15 0.00 - 0.40 10*3/mm3    Basophils, Absolute 0.06 0.00 - 0.20 10*3/mm3    Immature Grans, Absolute 0.03 0.00 - 0.05 10*3/mm3    nRBC 0.0 0.0 - 0.2 /100 WBC   ECG 12 Lead    Collection Time: 03/05/22  5:19 PM   Result Value Ref Range    QT Interval 365 ms       Ordered the above labs and independently reviewed the results.      RADIOLOGY  No Radiology Exams Resulted Within Past 24 Hours    I ordered the above noted radiological studies. Reviewed by me and discussed with radiologist.  See dictation for official radiology interpretation.      PROCEDURES  Procedures      MEDICATIONS GIVEN IN ER    Medications   sodium chloride 0.9 % flush 10 mL (has no administration in time range)         PROGRESS, DATA ANALYSIS, CONSULTS, AND MEDICAL DECISION MAKING    All labs have been independently reviewed by me.  All radiology studies have been reviewed by me and discussed with radiologist dictating the report.   EKG's independently viewed and interpreted by me.  Discussion below represents my analysis of pertinent findings related to patient's condition, differential diagnosis, treatment plan and final disposition.      ED Course as of 03/05/22 1844   Sat Mar 05, 2022   1707 [id number] female with near syncopal episode earlier this morning at [employer].  Patient feeling better now but pulse at triage was reported to be 122.  On my exam I do hear a subtle systolic murmur.  Differential diagnosis would include but is not limited to the following:  Vasovagal syncope  Orthostatic syncope  Cardiac arrhythmia  Anemia  Dehydration  Pulmonary embolism (no significant risk factors for PE) [DB]   1704 Review of old records shows that patient  had an echo in 2016.  There was no significant valvular abnormality and ejection fraction was 54%. [DB]   1757 EKG          EKG time: 1719  Rhythm/Rate: Sinus 74  P waves and MO: Elevated P waves and MO interval  QRS, axis: Normal axis, normal QRS  ST and T waves: Unremarkable ST and T wave    Interpreted Contemporaneously by me, independently viewed  No prior to compare   [DB]   1758 Labs reviewed and pretty unremarkable.  D-dimer and troponin are both normal which would go against acute coronary syndrome or DVT/PE.  Chemistries are benign.  CBC reveals mildly of a white count of 11.2 of doubtful clinical significance.  Blood pressure and heart rate have improved without treatment.  Will discuss results of testing with patient.  Given her age and heart murmur we could certainly make this an observation admission or let her go home and follow-up with primary care provider. [DB]   1840 I had very long discussions with both patient and her daughter at bedside.  We discussed the pros and cons of observation admission for the work-up of near syncope.  Patient does have slight murmur which has been heard in the past but did have reassuring echo about 6 years ago.  Patient was somewhat nervous initially as she did lose her  to a heart attack in the past but ultimately decided she did not want to be kept overnight in the observation unit for overnight monitoring, echo and cardiology consultation.  She would much rather follow-up as outpatient and I will go ahead and place a Holter monitor as well as place a cardiology referral.  Patient may well need a repeat echo as outpatient as she does have a noted heart murmur although prior echo was benign as mentioned above.  Again patient and daughter are very comfortable with going home and have declined offered observation admission. [DB]   1842 Also noted [place] syncope score is 0 indicating low probability of serious pathology. [DB]      ED Course User Index  [DB]  Partha Ribera MD       AS OF 18:44 EST VITALS:    BP - 140/74  HR - 92  TEMP - 98.5 °F (36.9 °C)  O2 SATS - 92%      DIAGNOSIS  Final diagnoses:   Near syncope   Heart murmur         DISPOSITION  DISCHARGE    Patient discharged in stable condition.    Reviewed implications of results, diagnosis, meds, responsibility to follow up, warning signs and symptoms of possible worsening, potential complications and reasons to return to ER, including increased chest pain, shortness of breath, lightheadedness or as needed.    Patient/Family voiced understanding of above instructions.    Discussed plan for discharge, as there is no emergent indication for admission. Patient referred to primary care provider for BP management due to today's BP. Pt/family is agreeable and understands need for follow up and repeat testing.  Pt is aware that discharge does not mean that nothing is wrong but it indicates no emergency is present that requires admission and they must continue care with follow-up as given below or physician of their choice.     FOLLOW-UP  [facility] CARDIOLOGY  [address]  [address]  [facility] [address] [phone]        Parrish Hawkins MD  [address]  [address]  [facility] [address]  [phone]          Parrish Hawkins MD  [address]  [address]  [place] [id number]  [phone]      Call for Appointment         Medication List      No changes were made to your prescriptions during this visit.                Partha Ribera MD  03/05/22 2690

## 2022-03-05 NOTE — EXTERNAL PATIENT INSTRUCTIONS
"Patient Education   Table of Contents       Heart Murmur       Near-Syncope     To view videos and all your education online visit,   [email or web address]   or scan this QR code with your smartphone.                  Heart Murmur     A heart murmur is an extra sound that is caused by chaotic blood flow through the valves of the heart. The murmur can be heard as a \"hum\" or \"whoosh\" sound when blood flows through the heart.    There are two types of heart murmurs:       Innocent (benign) murmurs. Most people with this type of heart murmur do not have a heart problem. Many children have innocent heart murmurs. Your health care provider may suggest some basic tests to find out whether your murmur is an innocent murmur. If an innocent heart murmur is found, there is no need for further tests or treatment and no need to restrict activities or stop playing sports.       Abnormal murmurs. These types of murmurs can occur in children and adults. Abnormal murmurs may be a sign of a more serious heart condition, such as a heart defect present at birth (congenital defect) or heart valve disease.     What are the causes?      The heart has four areas called chambers. Valves separate the upper and lower chambers from each other (tricuspid valve and mitral valve) and separate the lower chambers of the heart from pathways that lead away from the heart (aortic valve and pulmonary valve).    Normally, the valves open to let blood flow through or out of your heart, and then they shut to keep the blood from flowing backward. This condition is caused by heart valves that are not working properly.       In children, abnormal heart murmurs are typically caused by congenital defects.       In adults, abnormal murmurs are usually caused by heart valve problems from disease, infection, or aging.      This condition may also be caused by:       Pregnancy.       Fever.       Overactive thyroid gland.       Anemia.       Exercise. "       Rapid growth spurts (in children).       What are the signs or symptoms?    Innocent murmurs do not cause symptoms, and many people with abnormal murmurs may not have symptoms. If symptoms do develop, they may include:       Shortness of breath.       Blue coloring of the skin, especially on the fingertips.       Chest pain.       Palpitations, or feeling a fluttering or skipped heartbeat.       Fainting.       Persistent cough.       Getting tired much faster than expected.       Swelling in the abdomen, feet, or ankles.     How is this diagnosed?    This condition may be diagnosed during a routine physical or other exam. If your health care provider hears a murmur with a stethoscope, he or she will listen for:       Where the murmur is located in your heart.       How long the murmur lasts (duration).       When the murmur is heard during the heartbeat.       How loud the murmur is. This may help the health care provider figure out what is causing the murmur.      You may be referred to a heart specialist (cardiologist). You may also have other tests, including:       Electrocardiogram (ECG or EKG). This test measures the electrical activity of your heart.       Echocardiogram. This test uses high frequency sound waves to make pictures of your heart.       MRI or chest X-ray.       Cardiac catheterization. This test looks at blood flow through the arteries around the heart.      For children and adults who have an abnormal heart murmur and want to stay active, it is important to:       Complete testing.       Review test results.       Receive recommendations from your health care provider.     If heart disease is present, it may not be safe to play or be active.   How is this treated?    Heart murmurs themselves do not need treatment. In some cases, a heart murmur may go away on its own. If an underlying problem or disease is causing the murmur, you may need treatment. If treatment is needed, it will depend  on the type and severity of the disease or heart problem causing the murmur. Treatment may include:       Medicine.       Surgery.       Dietary and lifestyle changes.     Follow these instructions at home:         Talk with your health care provider before participating in sports or other activities that require a lot of effort and energy (are strenuous).       Learn as much as possible about your condition and any related diseases. Ask your health care provider if you may be at risk for any medical emergencies.       Talk with your health care provider about what symptoms you should look out for.       It is up to you to get your test results. Ask your health care provider, or the department that is doing the test, when your results will be ready.       Keep all follow-up visits as told by your health care provider. This is important.     Contact a health care provider if:         You are frequently short of breath.       You feel more tired than usual.       You are having a hard time keeping up with normal activities or fitness routines.       You have swelling in your ankles or feet.       You notice that your heart often beats irregularly.       You develop any new symptoms.     Get help right away if:         You have chest pain.       You are having trouble breathing.       You feel light-headed or you pass out.       Your symptoms suddenly get worse.     These symptoms may represent a serious problem that is an emergency. Do not wait to see if the symptoms will go away. Get medical help right away. Call your local emergency services (911 in the [place] Do not drive yourself to the hospital.   Summary         Normally, the heart valves open to let blood flow through or out of your heart, and then they shut to keep the blood from flowing backward.       A heart murmur is caused by heart valves that are not working properly.       You may need treatment if an underlying problem or disease is causing the heart  murmur. Treatment may include medicine, surgery, or dietary and lifestyle changes.       Talk with your health care provider before participating in sports or other activities that require a lot of effort and energy (are strenuous).       Talk with your health care provider about what symptoms you should watch out for.     This information is not intended to replace advice given to you by your health care provider. Make sure you discuss any questions you have with your health care provider.     Document Released: 01/25/2006Document Revised: 06/11/2019Document Reviewed: 06/11/2019     [employer] Patient Education ? 2021 [employer]         Near-Syncope         Near-syncope is when you suddenly feel like you might pass out (faint), but you do not actually lose consciousness. This may also be referred to as presyncope. During an episode of near-syncope, you may:       Feel dizzy, weak, or light-headed.       Feel nauseous.       See all white or all black in your field of vision, or see spots.       Have cold, clammy skin.     This condition is caused by a sudden decrease in blood flow to the brain. This decrease can result from various causes, but most of those causes are not dangerous. However, near-syncope may be a sign of a serious medical problem, so it is important to seek medical care.     Follow these instructions at home:   Medicines         Take over-the-counter and prescription medicines only as told by your health care provider.       If you are taking blood pressure or heart medicine, get up slowly and take several minutes to sit and then stand. This can reduce dizziness.     General instructions         Pay attention to any changes in your symptoms.       Talk with your health care provider about your symptoms. You may need to have testing to understand the cause of your near-syncope.       If you start to feel like you might faint, lie down right away and raise (elevate) your feet above the level of your  heart. Breathe deeply and steadily. Wait until all of the symptoms have passed.       Have someone stay with you until you feel stable.      Do not  drive, use machinery, or play sports until your health care provider says it is okay.       Drink enough fluid to keep your urine pale yellow.       Keep all follow-up visits as told by your health care provider. This is important.     Get help right away if you:         Have a seizure.       Have unusual pain in your chest, abdomen, or back.       Faint once or repeatedly.       Have a severe headache.       Are bleeding from your mouth or rectum, or you have black or tarry stool.       Have a very fast or irregular heartbeat (palpitations).       Are confused.       Have trouble walking.       Have severe weakness.       Have vision problems.     These symptoms may represent a serious problem that is an emergency. Do not wait to see if your symptoms will go away. Get medical help right away. Call your local emergency services (911 in the [place] Do not drive yourself to the hospital.   Summary         Near-syncope is when you suddenly feel like you might pass out (faint), but you do not actually lose consciousness.       This condition is caused by a sudden decrease in blood flow to the brain. This decrease can result from various causes, but most of those causes are not dangerous.       Near-syncope may be a sign of a serious medical problem, so it is important to seek medical care.     This information is not intended to replace advice given to you by your health care provider. Make sure you discuss any questions you have with your health care provider.     Document Released: 12/18/2006Document Revised: 04/10/2020Document Reviewed: 11/06/2019     [employer] Patient Education ? 2021 [employer]

## 2022-03-07 ENCOUNTER — OFFICE VISIT (OUTPATIENT)
Dept: FAMILY MEDICINE CLINIC | Facility: CLINIC | Age: 57
End: 2022-03-07

## 2022-03-07 VITALS
DIASTOLIC BLOOD PRESSURE: 76 MMHG | OXYGEN SATURATION: 98 % | BODY MASS INDEX: 41.98 KG/M2 | TEMPERATURE: 96.4 F | SYSTOLIC BLOOD PRESSURE: 122 MMHG | HEART RATE: 97 BPM | HEIGHT: 63 IN | WEIGHT: 236.9 LBS

## 2022-03-07 DIAGNOSIS — F33.0 MILD EPISODE OF RECURRENT MAJOR DEPRESSIVE DISORDER: ICD-10-CM

## 2022-03-07 DIAGNOSIS — R55 NEAR SYNCOPE: Primary | ICD-10-CM

## 2022-03-07 DIAGNOSIS — T88.7XXA MEDICATION SIDE EFFECT: ICD-10-CM

## 2022-03-07 PROCEDURE — 99214 OFFICE O/P EST MOD 30 MIN: CPT | Performed by: FAMILY MEDICINE

## 2022-03-07 NOTE — PROGRESS NOTES
"Chief Complaint  Hospital Follow Up Visit (ER f/u for  near syncope)    Subjective     {Problem List  Visit Diagnosis   Encounters  Notes  Medications  Labs  Result Review Imaging  Media :23}     Stacy Salomon presents to [facility]  History of Present Illness     I saw the patient on a video visit about 10 days ago for recurrent major depression.  Mild to moderate symptoms.  No anxiety.  Some anhedonia and inattention.  She previously had been on bupropion a few years earlier without any issues.  She started her medicine about 5 to 6 days ago.  She took it for 4 days.  On the fourth day, Saturday, she had exercise without trouble.  No cardiovascular symptoms with exercise.  She then went to [employer] on an errand.  There she did not feel well.  She felt sort of distant.  She states her vision started to go black.  Her heart was going faster but not irregular.  She felt like she was going to pass out but she did not.  She called her daughter.  Her blood pressure was elevated.  She went to the urgent care who then sent her to the emergency room because her blood pressure was high.  In the emergency room she had a negative cardiac work-up.  Her troponin was negative.  D-dimer was negative.  Her blood pressure was initially elevated but then came down.  There is no evidence of atrial fibrillation.  She was discharged home with a Holter monitor and cardiology follow-up.  She has not restarted her bupropion the last 2 days.  Her mood is unchanged.  Still mild depression.  She had echocardiogram done in 2016 that was essentially normal.  She was told she had a heart murmur at the emergency room.  She was told to follow-up with a cardiologist.  She has been checking her blood pressure over the last few days.  Is averaging about 130 systolic over mid 80s diastolic.    Objective   Vital Signs:   /76   Pulse 97   Temp 96.4 °F (35.8 °C)   Ht 160 cm (63\")   Wt 107 kg (236 lb 14.4 " oz)   SpO2 98%   BMI 41.96 kg/m²     Physical Exam  Vitals and nursing note reviewed.   Constitutional:       General: She is not in acute distress.     Appearance: She is well-developed.   Cardiovascular:      Rate and Rhythm: Normal rate and regular rhythm.      Heart sounds: Murmur ( There is a faint 1/6 systolic murmur over the precordium.  Sounds similar to both sides of the sternum.) heard.   Pulmonary:      Effort: Pulmonary effort is normal.      Breath sounds: Normal breath sounds.   Musculoskeletal:      Cervical back: Normal range of motion.   Skin:     General: Skin is warm and dry.   Psychiatric:      Comments: Affect slightly flat.        Result Review :   The following data was reviewed by: Parrish Hawkins MD on 03/07/2022:  Common labs    Common Labsle 4/26/21 4/26/21 10/21/21 10/21/21 10/21/21 3/5/22 3/5/22    0947 0947 0908 0908 0908 1714 1714   Glucose 97   101 (A)   108 (A)   BUN 17   20   18   Creatinine 0.75   0.70   0.91   eGFR Non  Am 81   99      eGFR  Am 98   114      Sodium 142   141   140   Potassium 4.4   4.3   3.5   Chloride 101   103   100   Calcium 10.2   9.5   9.7   Total Protein 7.3   7.3      Albumin 4.50   4.5   4.20   Total Bilirubin 0.4   0.3   0.2   Alkaline Phosphatase 92   92   96   AST (SGOT) 14   13   16   ALT (SGPT) 23   18   24   WBC   8.8   11.19 (A)    Hemoglobin   13.7   14.2    Hematocrit   40.9   41.3    Platelets   368   353    Total Cholesterol  184   185     Triglycerides  52   54     HDL Cholesterol  74 (A)   68     LDL Cholesterol   100   107 (A)     (A) Abnormal value       Comments are available for some flowsheets but are not being displayed.           Data reviewed: Recent hospitalization notes ER notes reviewed in detail.           Assessment and Plan    Diagnoses and all orders for this visit:    1. Near syncope (Primary)  -     Adult Transthoracic Echo Complete W/ Cont if Necessary Per Protocol; Future  -     Ambulatory Referral to  Cardiology    2. Medication side effect  -     Adult Transthoracic Echo Complete W/ Cont if Necessary Per Protocol; Future  -     Ambulatory Referral to Cardiology    3. Mild episode of recurrent major depressive disorder (HCC)      Near syncopal episode with transient elevated blood pressure and heart rate.  Likely multifactorial causes.  However she had recently started bupropion 4 days earlier.  This certainly could have raised her heart rate and her blood pressure.  She has since stopped the bupropion.  She is continuing her hydrochlorothiazide for hypertension.  I attempted to restart the bupropion, at a lower dose strategy, taking every other day for the first 8 days.  We will going to hold off for now.  I do recommend cardiac evaluation.  Repeat echocardiogram.  There is a heart murmur on examination today but it is fairly benign sounding.  She has had no recurrent symptomatology since stopping the bupropion.  And her depression is stable and mild.  I will see her back after the Holter monitor, echocardiogram, and cardiac consultation.  We may be able to restart the bupropion or switch to a activating SSRI such as fluoxetine.  However, given her obesity bupropion would be conceivably better.    I spent 30 minutes caring for Stacy on this date of service. This time includes time spent by me in the following activities:preparing for the visit, reviewing tests, performing a medically appropriate examination and/or evaluation , ordering medications, tests, or procedures and documenting information in the medical record  Follow Up   No follow-ups on file.  Patient was given instructions and counseling regarding her condition or for health maintenance advice. Please see specific information pulled into the AVS if appropriate.

## 2022-03-09 ENCOUNTER — APPOINTMENT (OUTPATIENT)
Dept: CARDIOLOGY | Facility: HOSPITAL | Age: 57
End: 2022-03-09

## 2022-03-09 ENCOUNTER — APPOINTMENT (OUTPATIENT)
Dept: GENERAL RADIOLOGY | Facility: HOSPITAL | Age: 57
End: 2022-03-09

## 2022-03-09 ENCOUNTER — HOSPITAL ENCOUNTER (OUTPATIENT)
Facility: HOSPITAL | Age: 57
Setting detail: OBSERVATION
Discharge: HOME | End: 2022-03-10
Attending: EMERGENCY MEDICINE | Admitting: EMERGENCY MEDICINE

## 2022-03-09 DIAGNOSIS — R00.0 TACHYCARDIA: Primary | ICD-10-CM

## 2022-03-09 LAB
ALBUMIN SERPL-MCNC: 3.9 G/DL (ref 3.5–5.2)
ALBUMIN/GLOB SERPL: 1.1 G/DL
ALP SERPL-CCNC: 94 U/L (ref 39–117)
ALT SERPL W P-5'-P-CCNC: 20 U/L (ref 1–33)
ANION GAP SERPL CALCULATED.3IONS-SCNC: 13.8 MMOL/L (ref 5–15)
AORTIC DIMENSIONLESS INDEX: 0.8 (DI)
AST SERPL-CCNC: 15 U/L (ref 1–32)
BASOPHILS # BLD AUTO: 0.05 10*3/MM3 (ref 0–0.2)
BASOPHILS NFR BLD AUTO: 0.5 % (ref 0–1.5)
BH CV ECHO MEAS - ACS: 1.74 CM
BH CV ECHO MEAS - AO MAX PG: 12.6 MMHG
BH CV ECHO MEAS - AO MEAN PG: 6.1 MMHG
BH CV ECHO MEAS - AO ROOT DIAM: 2.8 CM
BH CV ECHO MEAS - AO V2 MAX: 177.8 CM/SEC
BH CV ECHO MEAS - AO V2 VTI: 31 CM
BH CV ECHO MEAS - AVA(I,D): 2.7 CM2
BH CV ECHO MEAS - EDV(CUBED): 53.4 ML
BH CV ECHO MEAS - EDV(MOD-SP2): 88 ML
BH CV ECHO MEAS - EDV(MOD-SP4): 115 ML
BH CV ECHO MEAS - EF(MOD-BP): 75.2 %
BH CV ECHO MEAS - EF(MOD-SP2): 68.2 %
BH CV ECHO MEAS - EF(MOD-SP4): 78.3 %
BH CV ECHO MEAS - ESV(CUBED): 16 ML
BH CV ECHO MEAS - ESV(MOD-SP2): 28 ML
BH CV ECHO MEAS - ESV(MOD-SP4): 25 ML
BH CV ECHO MEAS - FS: 33 %
BH CV ECHO MEAS - IVS/LVPW: 0.98 CM
BH CV ECHO MEAS - IVSD: 1.18 CM
BH CV ECHO MEAS - LAT PEAK E' VEL: 8.8 CM/SEC
BH CV ECHO MEAS - LV DIASTOLIC VOL/BSA (35-75): 55 CM2
BH CV ECHO MEAS - LV MASS(C)D: 149.4 GRAMS
BH CV ECHO MEAS - LV MAX PG: 7.1 MMHG
BH CV ECHO MEAS - LV MEAN PG: 3.3 MMHG
BH CV ECHO MEAS - LV SYSTOLIC VOL/BSA (12-30): 12 CM2
BH CV ECHO MEAS - LV V1 MAX: 132.9 CM/SEC
BH CV ECHO MEAS - LV V1 VTI: 26.3 CM
BH CV ECHO MEAS - LVIDD: 3.8 CM
BH CV ECHO MEAS - LVIDS: 2.5 CM
BH CV ECHO MEAS - LVOT AREA: 3.1 CM2
BH CV ECHO MEAS - LVOT DIAM: 2 CM
BH CV ECHO MEAS - LVPWD: 1.2 CM
BH CV ECHO MEAS - MED PEAK E' VEL: 7.6 CM/SEC
BH CV ECHO MEAS - MV A MAX VEL: 115.4 CM/SEC
BH CV ECHO MEAS - MV DEC SLOPE: 470.6 CM/SEC2
BH CV ECHO MEAS - MV DEC TIME: 226 MSEC
BH CV ECHO MEAS - MV E MAX VEL: 102 CM/SEC
BH CV ECHO MEAS - MV E/A: 0.88
BH CV ECHO MEAS - MV MAX PG: 5.6 MMHG
BH CV ECHO MEAS - MV MEAN PG: 3 MMHG
BH CV ECHO MEAS - MV V2 VTI: 31.2 CM
BH CV ECHO MEAS - MVA(VTI): 2.7 CM2
BH CV ECHO MEAS - PA V2 MAX: 119.4 CM/SEC
BH CV ECHO MEAS - RAP SYSTOLE: 3 MMHG
BH CV ECHO MEAS - RV MAX PG: 3.2 MMHG
BH CV ECHO MEAS - RV V1 MAX: 90 CM/SEC
BH CV ECHO MEAS - RV V1 VTI: 18.6 CM
BH CV ECHO MEAS - RVOT DIAM: 2.5 CM
BH CV ECHO MEAS - SI(MOD-SP2): 28.7 ML/M2
BH CV ECHO MEAS - SI(MOD-SP4): 43.1 ML/M2
BH CV ECHO MEAS - SV(LVOT): 82.7 ML
BH CV ECHO MEAS - SV(MOD-SP2): 60 ML
BH CV ECHO MEAS - SV(MOD-SP4): 90 ML
BH CV ECHO MEAS - SV(RVOT): 91.5 ML
BH CV ECHO MEAS - TAPSE (>1.6): 1.8 CM
BH CV ECHO MEASUREMENTS AVERAGE E/E' RATIO: 12.44
BH CV XLRA - TDI S': 12.5 CM/SEC
BILIRUB SERPL-MCNC: 0.3 MG/DL (ref 0–1.2)
BUN SERPL-MCNC: 16 MG/DL (ref 6–20)
BUN/CREAT SERPL: 21.9 (ref 7–25)
CALCIUM SPEC-SCNC: 9.1 MG/DL (ref 8.6–10.5)
CHLORIDE SERPL-SCNC: 100 MMOL/L (ref 98–107)
CO2 SERPL-SCNC: 25.2 MMOL/L (ref 22–29)
CREAT SERPL-MCNC: 0.73 MG/DL (ref 0.57–1)
DEPRECATED RDW RBC AUTO: 42 FL (ref 37–54)
EGFRCR SERPLBLD CKD-EPI 2021: 97.9 ML/MIN/1.73
EOSINOPHIL # BLD AUTO: 0.17 10*3/MM3 (ref 0–0.4)
EOSINOPHIL NFR BLD AUTO: 1.6 % (ref 0.3–6.2)
ERYTHROCYTE [DISTWIDTH] IN BLOOD BY AUTOMATED COUNT: 12.8 % (ref 12.3–15.4)
GLOBULIN UR ELPH-MCNC: 3.6 GM/DL
GLUCOSE SERPL-MCNC: 102 MG/DL (ref 65–99)
HCG SERPL QL: NEGATIVE
HCT VFR BLD AUTO: 40.2 % (ref 34–46.6)
HGB BLD-MCNC: 13.7 G/DL (ref 12–15.9)
HOLD SPECIMEN: NORMAL
IMM GRANULOCYTES # BLD AUTO: 0.03 10*3/MM3 (ref 0–0.05)
IMM GRANULOCYTES NFR BLD AUTO: 0.3 % (ref 0–0.5)
LEFT ATRIUM VOLUME INDEX: 19.9 ML/M2
LYMPHOCYTES # BLD AUTO: 3.76 10*3/MM3 (ref 0.7–3.1)
LYMPHOCYTES NFR BLD AUTO: 36.2 % (ref 19.6–45.3)
MAXIMAL PREDICTED HEART RATE: 166 BPM
MAXIMAL PREDICTED HEART RATE: 166 BPM
MCH RBC QN AUTO: 30.4 PG (ref 26.6–33)
MCHC RBC AUTO-ENTMCNC: 34.1 G/DL (ref 31.5–35.7)
MCV RBC AUTO: 89.3 FL (ref 79–97)
MONOCYTES # BLD AUTO: 0.71 10*3/MM3 (ref 0.1–0.9)
MONOCYTES NFR BLD AUTO: 6.8 % (ref 5–12)
NEUTROPHILS NFR BLD AUTO: 5.67 10*3/MM3 (ref 1.7–7)
NEUTROPHILS NFR BLD AUTO: 54.6 % (ref 42.7–76)
NRBC BLD AUTO-RTO: 0 /100 WBC (ref 0–0.2)
PLATELET # BLD AUTO: 352 10*3/MM3 (ref 140–450)
PMV BLD AUTO: 9.5 FL (ref 6–12)
POTASSIUM SERPL-SCNC: 3.6 MMOL/L (ref 3.5–5.2)
PROT SERPL-MCNC: 7.5 G/DL (ref 6–8.5)
RBC # BLD AUTO: 4.5 10*6/MM3 (ref 3.77–5.28)
SARS-COV-2 RNA PNL SPEC NAA+PROBE: NOT DETECTED
SINUS: 2.8 CM
SODIUM SERPL-SCNC: 139 MMOL/L (ref 136–145)
STRESS TARGET HR: 141 BPM
STRESS TARGET HR: 141 BPM
T4 FREE SERPL-MCNC: 1.39 NG/DL (ref 0.93–1.7)
TROPONIN T SERPL-MCNC: <0.01 NG/ML (ref 0–0.03)
TROPONIN T SERPL-MCNC: <0.01 NG/ML (ref 0–0.03)
TSH SERPL DL<=0.05 MIU/L-ACNC: 3.65 UIU/ML (ref 0.27–4.2)
WBC NRBC COR # BLD: 10.39 10*3/MM3 (ref 3.4–10.8)
WHOLE BLOOD HOLD SPECIMEN: NORMAL
WHOLE BLOOD HOLD SPECIMEN: NORMAL

## 2022-03-09 PROCEDURE — 71046 X-RAY EXAM CHEST 2 VIEWS: CPT

## 2022-03-09 PROCEDURE — 99284 EMERGENCY DEPT VISIT MOD MDM: CPT

## 2022-03-09 PROCEDURE — 93306 TTE W/DOPPLER COMPLETE: CPT

## 2022-03-09 PROCEDURE — 84484 ASSAY OF TROPONIN QUANT: CPT | Performed by: EMERGENCY MEDICINE

## 2022-03-09 PROCEDURE — 93306 TTE W/DOPPLER COMPLETE: CPT | Performed by: INTERNAL MEDICINE

## 2022-03-09 PROCEDURE — 80050 GENERAL HEALTH PANEL: CPT

## 2022-03-09 PROCEDURE — 84439 ASSAY OF FREE THYROXINE: CPT | Performed by: NURSE PRACTITIONER

## 2022-03-09 PROCEDURE — 84703 CHORIONIC GONADOTROPIN ASSAY: CPT

## 2022-03-09 PROCEDURE — 93227 XTRNL ECG REC<48 HR R&I: CPT | Performed by: INTERNAL MEDICINE

## 2022-03-09 PROCEDURE — 87635 SARS-COV-2 COVID-19 AMP PRB: CPT | Performed by: NURSE PRACTITIONER

## 2022-03-09 PROCEDURE — 25010000002 PERFLUTREN (DEFINITY) 8.476 MG IN SODIUM CHLORIDE (PF) 0.9 % 10 ML INJECTION: Performed by: STUDENT IN AN ORGANIZED HEALTH CARE EDUCATION/TRAINING PROGRAM

## 2022-03-09 PROCEDURE — G0378 HOSPITAL OBSERVATION PER HR: HCPCS

## 2022-03-09 PROCEDURE — 93010 ELECTROCARDIOGRAM REPORT: CPT | Performed by: INTERNAL MEDICINE

## 2022-03-09 PROCEDURE — 93005 ELECTROCARDIOGRAM TRACING: CPT | Performed by: EMERGENCY MEDICINE

## 2022-03-09 PROCEDURE — 93005 ELECTROCARDIOGRAM TRACING: CPT

## 2022-03-09 PROCEDURE — C9803 HOPD COVID-19 SPEC COLLECT: HCPCS

## 2022-03-09 PROCEDURE — 84484 ASSAY OF TROPONIN QUANT: CPT

## 2022-03-09 RX ORDER — SODIUM CHLORIDE 0.9 % (FLUSH) 0.9 %
10 SYRINGE (ML) INJECTION AS NEEDED
Status: DISCONTINUED | OUTPATIENT
Start: 2022-03-09 | End: 2022-03-10 | Stop reason: HOSPADM

## 2022-03-09 RX ORDER — ONDANSETRON 2 MG/ML
4 INJECTION INTRAMUSCULAR; INTRAVENOUS EVERY 6 HOURS PRN
Status: DISCONTINUED | OUTPATIENT
Start: 2022-03-09 | End: 2022-03-10 | Stop reason: HOSPADM

## 2022-03-09 RX ORDER — ONDANSETRON 4 MG/1
4 TABLET, FILM COATED ORAL EVERY 6 HOURS PRN
Status: DISCONTINUED | OUTPATIENT
Start: 2022-03-09 | End: 2022-03-10 | Stop reason: HOSPADM

## 2022-03-09 RX ORDER — SODIUM CHLORIDE 0.9 % (FLUSH) 0.9 %
10 SYRINGE (ML) INJECTION EVERY 12 HOURS SCHEDULED
Status: DISCONTINUED | OUTPATIENT
Start: 2022-03-09 | End: 2022-03-10 | Stop reason: HOSPADM

## 2022-03-09 RX ORDER — ACETAMINOPHEN 325 MG/1
650 TABLET ORAL EVERY 4 HOURS PRN
Status: DISCONTINUED | OUTPATIENT
Start: 2022-03-09 | End: 2022-03-10 | Stop reason: HOSPADM

## 2022-03-09 RX ORDER — HYDROCHLOROTHIAZIDE 25 MG/1
25 TABLET ORAL DAILY
Status: DISCONTINUED | OUTPATIENT
Start: 2022-03-09 | End: 2022-03-10 | Stop reason: HOSPADM

## 2022-03-09 RX ORDER — NITROGLYCERIN 0.4 MG/1
0.4 TABLET SUBLINGUAL
Status: DISCONTINUED | OUTPATIENT
Start: 2022-03-09 | End: 2022-03-10 | Stop reason: HOSPADM

## 2022-03-09 RX ORDER — CHOLECALCIFEROL (VITAMIN D3) 125 MCG
5 CAPSULE ORAL NIGHTLY PRN
Status: DISCONTINUED | OUTPATIENT
Start: 2022-03-09 | End: 2022-03-10 | Stop reason: HOSPADM

## 2022-03-09 RX ADMIN — Medication 10 ML: at 19:26

## 2022-03-09 RX ADMIN — PERFLUTREN 2 ML: 6.52 INJECTION, SUSPENSION INTRAVENOUS at 17:42

## 2022-03-09 NOTE — ED PROVIDER NOTES
MD ATTESTATION NOTE    The ANIRUDH and I have discussed this patient's history, physical exam, and treatment plan.  I have reviewed the documentation and personally had a face to face interaction with the patient. I affirm the documentation and agree with the treatment and plan.  The attached note describes my personal findings.      I provided a substantive portion of the care of the patient.  I personally performed the physical exam in its entirety, and below are my findings.  For this patient encounter, the patient wore surgical mask, I wore full protective PPE including N95 and eye protection.      Brief HPI: Patient presents for evaluation of type palpitations and tachycardia.  Patient had an episode of near syncope last time.  Patient now had episodes of tachycardia.  No significant chest pain.  No leg swelling.    PHYSICAL EXAM  ED Triage Vitals   Temp Heart Rate Resp BP SpO2   03/09/22 1357 03/09/22 1329 03/09/22 1329 03/09/22 1332 03/09/22 1329   97.8 °F (36.6 °C) (!) 124 20 93/63 98 %      Temp src Heart Rate Source Patient Position BP Location FiO2 (%)   03/09/22 1357 -- -- -- --   Tympanic             GENERAL: no acute distress  HENT: nares patent  EYES: no scleral icterus  CV: regular rhythm, normal rate  RESPIRATORY: normal effort  ABDOMEN: soft  MUSCULOSKELETAL: no deformity  NEURO: alert, moves all extremities, follows commands  PSYCH:  calm, cooperative  SKIN: warm, dry    Vital signs and nursing notes reviewed.        Plan: Patient will be admitted to observation unit for further evaluation       Jim Marte MD  03/09/22 1600

## 2022-03-09 NOTE — ED PROVIDER NOTES
"EMERGENCY DEPARTMENT ENCOUNTER    Room Number:  36/36  Date seen:  3/9/2022  Time seen: 15:25 EST  PCP: Parrish Hawkins MD  Historian: patient, recent ED visit    HPI:  Chief complaint:tachycardia  A complete HPI/ROS/PMH/PSH/SH/FH are unobtainable due to: n/a  Context:Stacy Salomon is a 56 y.o. female who presents to the ED with c/o episode of tachycardia ( per home pulse oximeter) and very mild chest discomfort which occurred PTA while on a conference call.  It was not made better by anything and improved once she got here.  She had this happen on Sunday while at [employer] store and was seen here.  OBS admission was offered but she declined.  She states the episode today did not really involved lightheadedness or \"almost passing out\" that the other episode did.  She denies any shortness of breath, n/v or diaphoresis. She did wear a holter for 1 day and has plans to see Dr. Perry.  She does say that the episode started 4 days after resuming Wellbutrin and that she has not stopped that medication.    Patient was placed in face mask in first look. Patient was wearing facemask when I entered the room and throughout our encounter. I wore full protective equipment throughout this patient encounter including a N95 face mask, eye shield and gloves. Hand hygiene/washing of hands was performed before donning protective equipment and after removal when leaving the room.      MEDICAL RECORD REVIEW    ALLERGIES  Patient has no known allergies.    PAST MEDICAL HISTORY  Active Ambulatory Problems     Diagnosis Date Noted   • Anxiety 03/02/2017   • Essential hypertension 10/23/2018   • Murmur, heart 04/23/2019   • Screen for colon cancer 11/06/2020     Resolved Ambulatory Problems     Diagnosis Date Noted   • No Resolved Ambulatory Problems     Past Medical History:   Diagnosis Date   • Hypertension        PAST SURGICAL HISTORY  Past Surgical History:   Procedure Laterality Date   • ADENOIDECTOMY     • APPENDECTOMY  1978   • " CHOLECYSTECTOMY  1993   • COLONOSCOPY N/A 12/17/2020    Procedure: COLONOSCOPY TO CECUM;  Surgeon: Jesus Rasheed MD;  Location: [facility];  Service: General;  Laterality: N/A;  PRE-SCREENING  POST- DIVERTICULOSIS   • GALLBLADDER SURGERY     • MOLE REMOVAL         FAMILY HISTORY  Family History   Problem Relation Age of Onset   • No Known Problems Mother    • Heart disease Father    • Hyperlipidemia Father    • Arthritis Maternal Grandmother    • COPD Maternal Grandmother    • Cancer Paternal Aunt        SOCIAL HISTORY  Social History     Socioeconomic History   • Marital status:    Tobacco Use   • Smoking status: Never Smoker   • Smokeless tobacco: Never Used   Substance and Sexual Activity   • Alcohol use: Yes     Comment: 2 glasses of wine/ month.   2 beers / month.  1 cocktail/mon   • Drug use: No   • Sexual activity: Never       REVIEW OF SYSTEMS  Review of Systems    All systems reviewed and negative except for those discussed in HPI.     PHYSICAL EXAM    ED Triage Vitals   Temp Heart Rate Resp BP SpO2   03/09/22 1357 03/09/22 1329 03/09/22 1329 03/09/22 1332 03/09/22 1329   97.8 °F (36.6 °C) (!) 124 20 93/63 98 %      Temp src Heart Rate Source Patient Position BP Location FiO2 (%)   03/09/22 1357 -- -- -- --   Tympanic         Physical Exam    I have reviewed the triage vital signs and nursing notes.      GENERAL: not distressed  HENT: nares patent, mm moist  EYES: no scleral icterus  NECK: no ROM limitations  CV: regular rhythm, regular rate, no murmur, no rubs, no gallups  RESPIRATORY: normal effort, CTAB  ABDOMEN: soft  : deferred  MUSCULOSKELETAL: no deformity, no lower extremity edema  NEURO: alert, moves all extremities, follows commands  SKIN: warm, dry    HEARTSCORE    History  Highly suspicious              2    Moderately suspicious             1    Slightly or non-suspicious             0    ECG  Significant ST depression              2    Nonspecific repol disturbance             1    Normal                           0    Age  > or = 65                          2     46-65                           1    < or = 45                          0    Risk factors (hypercholesterolemia, HTN, DM, smoking, pos fam hx, obesity)                            > or = to 3 RF for atherosclerotic dx   2    1 or 2                 1    No risk factors                0    Troponin > or = 3x normal limit               2    1-3x normal limit    1    < or = Normal limit    0    Score  0 - 3 is low risk (0.9-1.7% risk of adverse cardiac event)  Score  4 - 6 is moderate risk (12-16.6% risk of adverse cardiac event)  Score  6 - 9 is high risk (50-65% risk of adverse cardiac event)    This patient's HEART score is 2     LAB RESULTS  Recent Results (from the past 24 hour(s))   Comprehensive Metabolic Panel    Collection Time: 03/09/22  2:50 PM    Specimen: Blood   Result Value Ref Range    Glucose 102 (H) 65 - 99 mg/dL    BUN 16 6 - 20 mg/dL    Creatinine 0.73 0.57 - 1.00 mg/dL    Sodium 139 136 - 145 mmol/L    Potassium 3.6 3.5 - 5.2 mmol/L    Chloride 100 98 - 107 mmol/L    CO2 25.2 22.0 - 29.0 mmol/L    Calcium 9.1 8.6 - 10.5 mg/dL    Total Protein 7.5 6.0 - 8.5 g/dL    Albumin 3.90 3.50 - 5.20 g/dL    ALT (SGPT) 20 1 - 33 U/L    AST (SGOT) 15 1 - 32 U/L    Alkaline Phosphatase 94 39 - 117 U/L    Total Bilirubin 0.3 0.0 - 1.2 mg/dL    Globulin 3.6 gm/dL    A/G Ratio 1.1 g/dL    BUN/Creatinine Ratio 21.9 7.0 - 25.0    Anion Gap 13.8 5.0 - 15.0 mmol/L    eGFR 97.9 >60.0 mL/min/1.73   Troponin    Collection Time: 03/09/22  2:50 PM    Specimen: Blood   Result Value Ref Range    Troponin T <0.010 0.000 - 0.030 ng/mL   hCG, Serum, Qualitative    Collection Time: 03/09/22  2:50 PM    Specimen: Blood   Result Value Ref Range    HCG Qualitative Negative Negative   CBC Auto Differential    Collection Time: 03/09/22  2:50 PM    Specimen: Blood   Result Value Ref Range    WBC 10.39 3.40 - 10.80 10*3/mm3    RBC 4.50 3.77 - 5.28  10*6/mm3    Hemoglobin 13.7 12.0 - 15.9 g/dL    Hematocrit 40.2 34.0 - 46.6 %    MCV 89.3 79.0 - 97.0 fL    MCH 30.4 26.6 - 33.0 pg    MCHC 34.1 31.5 - 35.7 g/dL    RDW 12.8 12.3 - 15.4 %    RDW-SD 42.0 37.0 - 54.0 fl    MPV 9.5 6.0 - 12.0 fL    Platelets 352 140 - 450 10*3/mm3    Neutrophil % 54.6 42.7 - 76.0 %    Lymphocyte % 36.2 19.6 - 45.3 %    Monocyte % 6.8 5.0 - 12.0 %    Eosinophil % 1.6 0.3 - 6.2 %    Basophil % 0.5 0.0 - 1.5 %    Immature Grans % 0.3 0.0 - 0.5 %    Neutrophils, Absolute 5.67 1.70 - 7.00 10*3/mm3    Lymphocytes, Absolute 3.76 (H) 0.70 - 3.10 10*3/mm3    Monocytes, Absolute 0.71 0.10 - 0.90 10*3/mm3    Eosinophils, Absolute 0.17 0.00 - 0.40 10*3/mm3    Basophils, Absolute 0.05 0.00 - 0.20 10*3/mm3    Immature Grans, Absolute 0.03 0.00 - 0.05 10*3/mm3    nRBC 0.0 0.0 - 0.2 /100 WBC         RADIOLOGY RESULTS  XR Chest 2 View    Result Date: 3/9/2022  XR CHEST 2 VW-  HISTORY: Female who is 56 years-old,  chest pain  TECHNIQUE: Frontal and lateral views of the chest  COMPARISON: 10/21/2021  FINDINGS: Heart, mediastinum and pulmonary vasculature are unremarkable. No focal pulmonary consolidation, pleural effusion, or pneumothorax. No acute osseous process.      No evidence for acute pulmonary process. Follow-up as clinical indications persist.  This report was finalized on 3/9/2022 2:44 PM by Dr. Ziggy Hitchcock M.D.           PROGRESS, DATA ANALYSIS, CONSULTS AND MEDICAL DECISION MAKING  All labs have been independently reviewed by me.  All radiology studies have been reviewed by me and discussed with radiologist dictating the report.  EKG's independently viewed and interpreted by me unless stated otherwise. Discussion below represents my analysis of pertinent findings related to patient's condition, differential diagnosis, treatment plan and final disposition.     ED Course as of 03/09/22 1548   Wed Mar 09, 2022   1530 EKG viewed by ER MD prior to my interpretation       EKG time:  "1337  Rhythm/Rate: 93, sinus rhythm  P waves and MI: normal MI, normal BRENT  QRS, axis: normal QRS, normal axis, ? LVH  ST and T waves: no acute ST/T wave abnormalities    Interpreted Contemporaneously by me, independently viewed  Improved from prior 3/5/22 with regard to rate.  [EW]   1535 Discussed OBS admission with Georgie Srivastava PA-C.  She agrees to admit.  [EW]      ED Course User Index  [EW] Albertina Jaffe, ADRI     DDX: tachycardia, thyroid abnormality, medication reaction, anxiety    MDM: The patient's emergency room work-up is unremarkable.  She will be admitted to the observation unit so that she can receive an echo and a cardiology consult.  She does not appear in any distress.  I do not suspect PE.  She has no hypoxia and her heart score is low.      Reviewed pt's history and workup with Dr. Marte.  After a bedside evaluation, Dr. Marte agrees with the plan of care.     Based on the patient's lab findings and presenting symptoms, the doctor and I feel it is appropriate to admit the patient for further management, evaluation, and treatment.  I have discussed this with the admitting team.  I have also discussed this with the patient/family.  They are in agreement with admission.          Disposition vitals:  /83   Pulse 85   Temp 97.8 °F (36.6 °C) (Tympanic)   Resp 20   Ht 160 cm (63\")   Wt 109 kg (240 lb)   LMP 12/17/2010   SpO2 99%   BMI 42.51 kg/m²       DIAGNOSIS  Final diagnoses:   Tachycardia       Admission to OBS unit     Albertina Jaffe APRN  03/09/22 1548    "

## 2022-03-09 NOTE — CASE MANAGEMENT/SOCIAL WORK
Discharge Planning Assessment  [facility]     Patient Name: Stacy Salomon  MRN: [id number]  Today's Date: 3/9/2022    Admit Date: 3/9/2022     Discharge Needs Assessment     Row Name 03/09/22 1557       Living Environment    People in Home child(philomena), adult    Name(s) of People in Home Aziza Salomon- daughter    Current Living Arrangements home    Primary Care Provided by self    Provides Primary Care For no one    Family Caregiver if Needed none    Quality of Family Relationships helpful;supportive    Able to Return to Prior Arrangements yes       Resource/Environmental Concerns    Resource/Environmental Concerns none       Transition Planning    Patient/Family Anticipates Transition to home with family    Patient/Family Anticipated Services at Transition none    Transportation Anticipated family or friend will provide       Discharge Needs Assessment    Equipment Currently Used at Home none    Concerns to be Addressed denies needs/concerns at this time;no discharge needs identified    Anticipated Changes Related to Illness none    Equipment Needed After Discharge none    Provided Post Acute Provider List? N/A               Discharge Plan     Row Name 03/09/22 1553       Plan    Plan Comments Entered room, introduced self and explained role w/PPE In place on self, patient and daughter at bedside; received permission to speak in front of daughter; verified information on facesheet; patient is independent w/ADL's; lives in a house - daughter lives there as well; works full time, still drives; RX are filled through [facility] on [address]; Patient presented to the ED today after episode of heart racing- pt reports feeling lightheaded; Patient denies needs at this time; Daughter plans to  patient upon d/c.              Continued Care and Services - Admitted Since 3/9/2022    Coordination has not been started for this encounter.          Demographic Summary     Row Name 03/09/22 1556       General Information     Admission Type observation    Arrived From home    Reason for Consult discharge planning;decision-making    Preferred Language English       Contact Information    Permission Granted to Share Info With ;family/designee  Daughter- Aziza Salomon               Functional Status     Row Name 03/09/22 1557       Functional Status    Usual Activity Tolerance excellent    Current Activity Tolerance good       Functional Status, IADL    Medications independent    Meal Preparation independent    Housekeeping independent    Laundry independent    Shopping independent       Mental Status    General Appearance WDL WDL       Mental Status Summary    Recent Changes in Mental Status/Cognitive Functioning no changes       Employment/    Employment Status employed full-time    Current or Previous Occupation self-employed               Psychosocial    No documentation.                Abuse/Neglect    No documentation.                Legal    No documentation.                Substance Abuse    No documentation.                Patient Forms    No documentation.                   Lynette Blankenship RN

## 2022-03-09 NOTE — PLAN OF CARE
Goal Outcome Evaluation:              Outcome Evaluation: patient admitted to obs unit for continuation of care r/t tachycardia, alert and oriented able to verbalize needs patient has not being tachycardic ever since arrival to unit, cardiology consulted and echocardiogram pending no questions at this time will continue to monitor

## 2022-03-10 ENCOUNTER — READMISSION MANAGEMENT (OUTPATIENT)
Dept: CALL CENTER | Facility: HOSPITAL | Age: 57
End: 2022-03-10

## 2022-03-10 VITALS
OXYGEN SATURATION: 95 % | WEIGHT: 235 LBS | TEMPERATURE: 97.7 F | BODY MASS INDEX: 41.64 KG/M2 | SYSTOLIC BLOOD PRESSURE: 121 MMHG | DIASTOLIC BLOOD PRESSURE: 75 MMHG | HEIGHT: 63 IN | HEART RATE: 70 BPM | RESPIRATION RATE: 16 BRPM

## 2022-03-10 LAB
ANION GAP SERPL CALCULATED.3IONS-SCNC: 13.7 MMOL/L (ref 5–15)
BUN SERPL-MCNC: 17 MG/DL (ref 6–20)
BUN/CREAT SERPL: 27 (ref 7–25)
CALCIUM SPEC-SCNC: 9 MG/DL (ref 8.6–10.5)
CHLORIDE SERPL-SCNC: 103 MMOL/L (ref 98–107)
CO2 SERPL-SCNC: 21.3 MMOL/L (ref 22–29)
CREAT SERPL-MCNC: 0.63 MG/DL (ref 0.57–1)
DEPRECATED RDW RBC AUTO: 42.7 FL (ref 37–54)
EGFRCR SERPLBLD CKD-EPI 2021: 105.6 ML/MIN/1.73
ERYTHROCYTE [DISTWIDTH] IN BLOOD BY AUTOMATED COUNT: 12.9 % (ref 12.3–15.4)
GLUCOSE SERPL-MCNC: 106 MG/DL (ref 65–99)
HCT VFR BLD AUTO: 39.6 % (ref 34–46.6)
HGB BLD-MCNC: 13.1 G/DL (ref 12–15.9)
MCH RBC QN AUTO: 29.9 PG (ref 26.6–33)
MCHC RBC AUTO-ENTMCNC: 33.1 G/DL (ref 31.5–35.7)
MCV RBC AUTO: 90.4 FL (ref 79–97)
PLATELET # BLD AUTO: 326 10*3/MM3 (ref 140–450)
PMV BLD AUTO: 9.5 FL (ref 6–12)
POTASSIUM SERPL-SCNC: 4.2 MMOL/L (ref 3.5–5.2)
QT INTERVAL: 419 MS
RBC # BLD AUTO: 4.38 10*6/MM3 (ref 3.77–5.28)
SODIUM SERPL-SCNC: 138 MMOL/L (ref 136–145)
TROPONIN T SERPL-MCNC: <0.01 NG/ML (ref 0–0.03)
WBC NRBC COR # BLD: 9.33 10*3/MM3 (ref 3.4–10.8)

## 2022-03-10 PROCEDURE — 93005 ELECTROCARDIOGRAM TRACING: CPT | Performed by: STUDENT IN AN ORGANIZED HEALTH CARE EDUCATION/TRAINING PROGRAM

## 2022-03-10 PROCEDURE — 80048 BASIC METABOLIC PNL TOTAL CA: CPT | Performed by: STUDENT IN AN ORGANIZED HEALTH CARE EDUCATION/TRAINING PROGRAM

## 2022-03-10 PROCEDURE — 84484 ASSAY OF TROPONIN QUANT: CPT | Performed by: STUDENT IN AN ORGANIZED HEALTH CARE EDUCATION/TRAINING PROGRAM

## 2022-03-10 PROCEDURE — G0378 HOSPITAL OBSERVATION PER HR: HCPCS

## 2022-03-10 PROCEDURE — 85027 COMPLETE CBC AUTOMATED: CPT | Performed by: STUDENT IN AN ORGANIZED HEALTH CARE EDUCATION/TRAINING PROGRAM

## 2022-03-10 PROCEDURE — 93010 ELECTROCARDIOGRAM REPORT: CPT | Performed by: INTERNAL MEDICINE

## 2022-03-10 PROCEDURE — 99204 OFFICE O/P NEW MOD 45 MIN: CPT | Performed by: INTERNAL MEDICINE

## 2022-03-10 RX ADMIN — HYDROCHLOROTHIAZIDE 25 MG: 25 TABLET ORAL at 08:04

## 2022-03-10 RX ADMIN — Medication 10 ML: at 08:06

## 2022-03-10 NOTE — PROGRESS NOTES
ED OBSERVATION PROGRESS/DISCHARGE SUMMARY    Date of Admission: 3/9/2022   LOS: 0 days   PCP: Parrish Hawkins MD    Final Diagnosis palpitations      Subjective   Patient is a pleasant afebrile 56-year-old  female admitted to the observation unit for palpitations and tachycardia.  This morning she reports she is feeling well and has not had any symptoms today or overnight.    She was seen and evaluated by cardiology today and they agree she is stable for discharge from their standpoint.  She recently had a Holter monitor and they do not feel that extended Zio patch would be needed at this time.  They did increase encouraged her to increase her p.o. fluid intake at home as this will likely improve her symptoms and encouraged her to keep her office appointment as already scheduled with Dr. Perry.    Hospital Outcome: Improved    ROS:  General: no fevers, chills  Respiratory: no cough, dyspnea  Cardiovascular: no chest pain, palpitations or edema  Abdomen: No abdominal pain, nausea, vomiting, or diarrhea  Neurologic: No focal weakness    Objective   Physical Exam:  I have reviewed the vital signs.  Temp:  [97.7 °F (36.5 °C)-98.2 °F (36.8 °C)] 97.7 °F (36.5 °C)  Heart Rate:  [] 70  Resp:  [16-20] 16  BP: ()/(63-85) 121/75  General Appearance:    Alert, cooperative, no distress  Head:    Normocephalic, atraumatic  Eyes:    Sclerae anicteric  Neck:   Supple, no mass  Lungs: Clear to auscultation bilaterally, respirations unlabored  Heart: Regular rate and rhythm, S1 and S2 normal, no murmur, rub or gallop  Abdomen:  Soft, non-tender, bowel sounds active, nondistended  Extremities: No clubbing, cyanosis, or edema to lower extremities  Pulses:  2+ and symmetric in distal lower extremities  Skin: No rashes   Psychiatric: Anxious affect, nonsuicidal  Neurologic: Oriented x3, Normal strength to extremities    Results Review:    I have reviewed the labs, radiology results and diagnostic studies.    Results  from last 7 days   Lab Units 03/10/22  0518   WBC 10*3/mm3 9.33   HEMOGLOBIN g/dL 13.1   HEMATOCRIT % 39.6   PLATELETS 10*3/mm3 326     Results from last 7 days   Lab Units 03/10/22  0518 03/09/22  1450 03/05/22  1714   SODIUM mmol/L 138 139 140   POTASSIUM mmol/L 4.2 3.6 3.5   CHLORIDE mmol/L 103 100 100   CO2 mmol/L 21.3* 25.2 27.3   BUN mg/dL 17 16 18   CREATININE mg/dL 0.63 0.73 0.91   CALCIUM mg/dL 9.0 9.1 9.7   BILIRUBIN mg/dL  --  0.3 0.2   ALK PHOS U/L  --  94 96   ALT (SGPT) U/L  --  20 24   AST (SGOT) U/L  --  15 16   GLUCOSE mg/dL 106* 102* 108*     Imaging Results (Last 24 Hours)     Procedure Component Value Units Date/Time    XR Chest 2 View [[id number]] Collected: 03/09/22 1444     Updated: 03/09/22 1447    Narrative:      XR CHEST 2 VW-     HISTORY: Female who is 56 years-old,  chest pain     TECHNIQUE: Frontal and lateral views of the chest     COMPARISON: 10/21/2021     FINDINGS: Heart, mediastinum and pulmonary vasculature are unremarkable.  No focal pulmonary consolidation, pleural effusion, or pneumothorax. No  acute osseous process.       Impression:      No evidence for acute pulmonary process. Follow-up as  clinical indications persist.     This report was finalized on 3/9/2022 2:44 PM by Dr. Ziggy Hitchcock M.D.           Echocardiogram 3/9/22:  Interpretation Summary    · Left ventricular ejection fraction appears to be 66 - 70%. Left ventricular systolic function is normal.  · Left ventricular wall thickness is consistent with mild concentric hypertrophy.  · Left ventricular diastolic function is consistent with (grade I) impaired relaxation.  · Normal right ventricular cavity size and systolic function noted.  · Normal left atrial cavity size noted.  · There is a trivial pericardial effusion.        I have reviewed the medications.  ---------------------------------------------------------------------------------------------  Assessment/Plan   Assessment/Problem List     Tachycardia      Plan:  Palpitation and tachycardia  -Seen and evaluated by Dr. Ramos with cardiology and cleared for discharge  -Echo 3/9/22 shows EF 66-70% and reassuring  -EKG shows normal sinus with a heart rate of 94  -Patient had a Holter monitor for 24 hours showed this rhythm as a predominant rhythm with rare occurrence of PACs.  There was no evidence of atrial arrhythmias and PVCs occurred rarely. No need for ZIO at this time per cardiology     Hypertension  -Chronic condition, continue medications  -Vital signs per nursing    Disposition: Home    Follow-up after Discharge: PCP & cardiology    This note will serve as a discharge summary.     Jazlyn Bowers, APRN 03/10/22 08:54 EST          I have worn appropriate PPE during this patient encounter, sanitized my hands both with entering and exiting patient's room.

## 2022-03-10 NOTE — H&P
.   [facility]   HISTORY AND PHYSICAL    Patient Name: Stacy Salomon  : 1965  MRN: [id number]  Primary Care Physician:  Parrish Hawkins MD  Date of admission: 3/9/2022    Subjective   Subjective     Chief Complaint: Palpitation    HPI:    Stacy Salomon is a 56 y.o. female from this medical history including anxiety and hypertension presents [facility] for evaluation of palpitation.  Patient reports she was in a call conference when she started having palpitation and head pressure.  Patient reports her blood pressure was 198/90 and her heart rate was 135.  Nothing exacerbated or alleviated her symptoms.  Patient reports she had similar symptoms on Saturday  had a syncopal episode and was seen here at the ED and was discharged home on Holter monitor.  Patient denies any associated nausea, vomiting, dyspnea, or back pain.  Patient's reports that she was in Wellbutrin for 4 days  believes that her symptoms is related to stopping it appropriately.  Currently patient denies headache, dizziness, or visual disturbance.  Denies unilateral weakness, dysphagia, or dysarthria.  Denies chest pain, abdominal pain, dyspnea, or N/V/D.  Denies cough, fever, or lower extremities edema.  Denies dysuria, materia, or increased urinary frequency/urgency.    Review of Systems   All systems were reviewed and negative except for: All systems were reviewed and negative except as above in HPI    Personal History     Past Medical History:   Diagnosis Date   • Anxiety    • Hypertension        Past Surgical History:   Procedure Laterality Date   • APPENDECTOMY     • CHOLECYSTECTOMY     • COLONOSCOPY N/A 2020    Procedure: COLONOSCOPY TO CECUM;  Surgeon: Jesus Rasheed MD;  Location: [facility] ENDOSCOPY;  Service: General;  Laterality: N/A;  PRE-SCREENING  POST- DIVERTICULOSIS   • GALLBLADDER SURGERY     • MOLE REMOVAL         Family History: family history includes Arthritis in her maternal grandmother;  COPD in her maternal grandmother; Cancer in her paternal aunt; Heart disease in her father; Hyperlipidemia in her father; No Known Problems in her mother. Otherwise pertinent FHx was reviewed and not pertinent to current issue.    Social History:  reports that she has never smoked. She has never used smokeless tobacco. She reports current alcohol use. She reports that she does not use drugs.    Home Medications:  Cetirizine HCl, Collagenase, albuterol sulfate HFA, calcium carb-cholecalciferol, fluticasone, hydroCHLOROthiazide, multivitamin, and vitamin E    Allergies:  No Known Allergies    Objective   Objective     Vitals:   Temp:  [97.8 °F (36.6 °C)-98.2 °F (36.8 °C)] 98.2 °F (36.8 °C)  Heart Rate:  [] 88  Resp:  [16-20] 16  BP: ()/(63-85) 143/77  Physical Exam    Constitutional: Awake, alert   Eyes: PERRLA, sclerae anicteric, no conjunctival injection   HENT: NCAT, mucous membranes moist   Neck: Supple, no thyromegaly, no lymphadenopathy, trachea midline   Respiratory: Clear to auscultation bilaterally, nonlabored respirations, no wheezing or stridor   Cardiovascular: RRR, +murmurs, no rubs, or gallops, palpable pedal pulses bilaterally   Gastrointestinal: Positive bowel sounds, soft, nontender, nondistended   Musculoskeletal: No bilateral ankle edema, no clubbing or cyanosis to extremities   Psychiatric: Appropriate affect, cooperative   Neurologic: Oriented x 3, strength symmetric in all extremities, Cranial Nerves grossly intact to confrontation, speech clear   Skin: No rashes     Result Review    Result Review:  I have personally reviewed the results from the time of this admission to 3/9/2022 19:38 EST and agree with these findings:  [x]  Laboratory  []  Microbiology  []  Radiology  []  EKG/Telemetry   []  Cardiology/Vascular   []  Pathology  []  Old records  []  Other:  Most notable findings include:  Troponin<0.010, glucose 102, potassium 306, creatinine 0.73, albumin 3.9, TSH 3.65, T4 1.39,  WBC 10.3, platelets 352  EKG shows sinus rhythm with a heart rate of 94 without any ST abnormalities.  Assessment/Plan   Assessment / Plan     Brief Patient Summary:  Stacy Salomon is a 56 y.o. female who seen and evaluated at the ED for palpitation and tachycardia.  Patient is being admitted to the observation unit for further evaluation cardiology consult    Active Hospital Problems:  Active Hospital Problems    Diagnosis    • Tachycardia      Plan:   Palpitation and tachycardia  -Cardiology consult  -Echo pending  -EKG shows normal sinus with a heart rate of 94  -Continuous telemetry monitoring  -Vital signs per nursing  -Patient had a Holter monitor for 24 hours showed this rhythm as a predominant rhythm with rare occurrence of PACs.  There was no evidence of atrial arrhythmias and PVCs occurred rarely.    Hypertension  -Chronic condition, continue medications  -Vital signs per nursing      DVT prophylaxis:  Mechanical DVT prophylaxis orders are present.    CODE STATUS:    Code Status (Patient has no pulse and is not breathing): CPR (Attempt to Resuscitate)  Medical Interventions (Patient has pulse or is breathing): Full Support    Admission Status:  I believe this patient meets observation status.    Electronically signed by ADRI Vargas, 03/09/22, 7:38 PM EST.

## 2022-03-10 NOTE — OUTREACH NOTE
Prep Survey    Flowsheet Row Responses   [facility] facility patient discharged from? [place]   Is LACE score < 7 ? Yes   Emergency Room discharge w/ pulse ox? No   Eligibility [facility]   Date of Admission 03/09/22   Date of Discharge 03/10/22   Discharge Disposition Home or Self Care   Discharge diagnosis Tachycardia-resolved   Does the patient have one of the following disease processes/diagnoses(primary or secondary)? Other   Does the patient have Home health ordered? No   Is there a DME ordered? No   Prep survey completed? Yes          SHAWN GONZALES - Registered Nurse

## 2022-03-10 NOTE — NURSING NOTE
Cardiology rounding nurse called and stated that consult for cardiology that was placed was never called. Pt was not on any list.  repaged the consult spoke to Judy.

## 2022-03-10 NOTE — CONSULTS
[facility] Consult Note    Patient Name: Stacy Salomon  :1965  56 y.o.    Date of Admission: 3/9/2022  Date of Consultation:  03/10/22  Encounter Provider: Suzanna Ramos MD  Place of Service: [facility] CARDIOLOGY  Referring Provider: Kwabena Abdullahi MD  Patient Care Team:  Parrish Hawkins MD as PCP - General (Family Medicine)      Chief complaint: palpitations    Reason for Consult: tachycardia     History of Present Illness: Ms Salomon is a 56 year old female with hypertension, anxiety, who presents for evaluation of near syncope and palpitations.    Patient reports that her first episode of near syncope occurred 5 days ago while she was out shopping at [employer].  Patient reports that she had gone walking earlier that day without any significant issues.  She admits that she had not had any water to drink and only had coffee.  While at [employer] looking around she suddenly developed tunnel vision and near syncope.  She called for help and immediately was able to sit down.  She was given both water and Gatorade to drink.  After the onset of the tunnel vision she reports that her heart rate started to rise.  She denies any associated shortness of breath or chest discomfort.  She ended up going to the emergency room where her work-up was unremarkable.  They did offer to admit her to the observation unit at that time but she declined.      She felt that her symptoms may have been due to recent initiation of Wellbutrin about 4 days prior.  She was discharged from the emergency room with a Holter monitor that was normal.  She stopped the medication following that episode.  She did well the rest of the day and weekend and did better about hydration.  On Monday she was seen by Dr. Hawkins her primary care physician.  It was felt that her episode was likely due to Wellbutrin use.    Yesterday the patient was on a conference call.  She reports that she was not feeling stressed more  than usual or anxious.  She had a recurrent episode of tunnel vision and near syncope.  Again her heart rate tana after the onset of the symptoms up into the 120s to 150s.  Her blood pressures were again elevated at that time similar to what she experienced with her prior episode.  She admits again that she had not had any water to drink and only had coffee by that point.  Due to the recurrent symptoms she opted to come back to the emergency room.  This time she was admitted to the observation unit.    Since her admission she has had brief episodes of near syncope but much milder than what she experienced previously.  She denies any chest discomfort or shortness of breath.  She reports that she has been trying to exercise in order to lose weight.  Outside of occasional episodes where her heart rates will rise up to the 160s with exercise she denies any significant issues.  She denies any palpitations since her admission.  There have been no reports of any arrhythmias on telemetry.    Since her admission her troponins have been normal along with her EKGs.  She underwent an echocardiogram this morning which showed normal left ventricular systolic function wall motion with an EF of 66 to 70%, grade 1 diastolic dysfunction, and no significant valvular disease.        Echocardiogram 3/9/22  · Left ventricular ejection fraction appears to be 66 - 70%. Left ventricular systolic function is normal.  · Left ventricular wall thickness is consistent with mild concentric hypertrophy.  · Left ventricular diastolic function is consistent with (grade I) impaired relaxation.  · Normal right ventricular cavity size and systolic function noted.  · Normal left atrial cavity size noted.  · There is a trivial pericardial effusion.        Previous Cardiac Testing:    Holter 3/5/22  Patient diary was not submitted. The predominant rhythm noted during the testing period was sinus rhythm. Premature atrial contractions occured rarely. There  was no evidence of atrial arrhythmias. Premature ventricular contractions occured rarely. No atrioventricular block noted.    Echocardiogram  9/7/16  · Left ventricular function is normal. Calculated EF = 54.4%. Estimated EF was in agreement with the calculated EF. Normal left ventricular cavity size and wall thickness noted. All left ventricular wall segments contract normally.  · Left ventricular diastolic function is normal.  · No significant valvular stenosis or insufficiency.        Past Medical History:   Diagnosis Date   • Anxiety    • Hypertension        Past Surgical History:   Procedure Laterality Date   • APPENDECTOMY  1978   • CHOLECYSTECTOMY  1993   • COLONOSCOPY N/A 12/17/2020    Procedure: COLONOSCOPY TO CECUM;  Surgeon: Jesus Rasheed MD;  [facility] END[facility]COPY;  Service: General;  Laterality: N/A;  PRE-SCREENING  POST- DIVERTICULOSIS   • GALLBLADDER SURGERY     • MOLE REMOVAL           Prior to Admission medications    Medication Sig Start Date End Date Taking? Authorizing Provider   calcium carb-cholecalciferol 600-800 MG-UNIT tablet Take  by mouth. 5/19/14  Yes Estee Hester MD   Cetirizine HCl 10 MG capsule Take  by mouth. 5/19/14  Yes Estee Hester MD   Collagenase powder    Yes Estee Hester MD   hydroCHLOROthiazide (HYDRODIURIL) 25 MG tablet TAKE 1 TABLET DAILY 10/4/21  Yes Parrish Hawkins MD   multivitamin (THERAGRAN) tablet tablet Take  by mouth Daily.   Yes Estee Hester MD   vitamin E 400 UNIT capsule Take 800 Units by mouth. 5/19/14  Yes Estee Hester MD   albuterol sulfate  (90 Base) MCG/ACT inhaler Inhale 2 puffs Every 4 (Four) Hours As Needed for Wheezing. 4/5/20   Heather Royal APRN   fluticasone (Flovent HFA) 110 MCG/ACT inhaler Inhale 1 puff 2 (Two) Times a Day. 10/27/20   Parrish Hawkins MD       No Known Allergies    Social History     Socioeconomic History   • Marital status:    • Number of children: 2   Tobacco  Use   • Smoking status: Never Smoker   • Smokeless tobacco: Never Used   Substance and Sexual Activity   • Alcohol use: Yes     Comment: rarely   • Drug use: No   • Sexual activity: Defer       Family History   Problem Relation Age of Onset   • No Known Problems Mother    • Heart disease Father    • Hyperlipidemia Father    • Arthritis Maternal Grandmother    • COPD Maternal Grandmother    • Cancer Paternal Aunt        REVIEW OF SYSTEMS:   All systems reviewed.  Pertinent positives identified in HPI.  All other systems are negative.      Objective:     Vitals:    03/09/22 1917 03/09/22 2301 03/10/22 0513 03/10/22 0700   BP: 143/77 143/81 125/72 121/75   BP Location: Right arm Right arm Right arm Right arm   Patient Position: Sitting Sitting Lying Lying   Pulse: 88 84 69 70   Resp: 16 16 16 16   Temp: 98.2 °F (36.8 °C) 97.7 °F (36.5 °C)  97.7 °F (36.5 °C)   TempSrc: Oral Oral  Oral   SpO2: 97% 95% 96% 95%   Weight:       Height:         Body mass index is 41.63 kg/m².    General Appearance:    Alert, cooperative, in no acute distress   Head:    Normocephalic, without obvious abnormality, atraumatic   Eyes:            Lids and lashes normal, conjunctivae and sclerae normal, no icterus, no pallor, corneas clear, PERRLA   Ears:    Ears appear intact with no abnormalities noted   Neck:   No adenopathy, supple, trachea midline, no thyromegaly, no carotid bruit, no JVD   Lungs:     Clear to auscultation, respirations regular, even and unlabored    Heart:    Regular rhythm and normal rate, normal S1 and S2, no murmur, no gallop, no rub, no click   Chest Wall:    No abnormalities observed   Abdomen:     Normal bowel sounds, no masses, no organomegaly, soft, nontender, nondistended, no guarding, no rebound  tenderness   Extremities:   Moves all extremities well, no edema, no cyanosis, no redness   Pulses:   Pulses palpable and equal bilaterally. Normal radial, carotid, femoral, dorsalis pedis and posterior tibial pulses  bilaterally. Normal abdominal aorta   Skin:  Psychiatric:   No bleeding, bruising or rash    Alert and oriented x 3, normal mood and affect   Lab Review:     Results from last 7 days   Lab Units 03/10/22  0518 03/09/22  1450   SODIUM mmol/L 138 139   POTASSIUM mmol/L 4.2 3.6   CHLORIDE mmol/L 103 100   CO2 mmol/L 21.3* 25.2   BUN mg/dL 17 16   CREATININE mg/dL 0.63 0.73   CALCIUM mg/dL 9.0 9.1   BILIRUBIN mg/dL  --  0.3   ALK PHOS U/L  --  94   ALT (SGPT) U/L  --  20   AST (SGOT) U/L  --  15   GLUCOSE mg/dL 106* 102*     Results from last 7 days   Lab Units 03/10/22  0518 03/09/22  1850 03/09/22  1450   TROPONIN T ng/mL <0.010 <0.010 <0.010     Results from last 7 days   Lab Units 03/10/22  0518   WBC 10*3/mm3 9.33   HEMOGLOBIN g/dL 13.1   HEMATOCRIT % 39.6   PLATELETS 10*3/mm3 326                           Admit EKG 3/10/22    Previous EKG 3/5/22      I personally viewed and interpreted the patient's EKG/Telemetry data.        Assessment and Plan:       1.  Near syncope.  Symptoms sound like they are due to either orthostatic or vasovagal issues.  Appears that her trigger may be due to relative dehydration and possibly recent Wellbutrin use.  I do not think her syncopal episodes are due to an arrhythmia since palpitation symptoms occur after the onset of near syncopal symptoms.  Echocardiogram here was unremarkable.  2.  Palpitations.  Suspect this is an anxiety response to her near syncopal episode rather than the cause of her near syncope.  3.  Hypertension.  Blood pressures have been normal here.  She reports that her blood pressures have been elevated with her episodes of near syncope and again I suspect this is due to anxiety from her near syncopal episodes.  4.  Anxiety  5.  Obesity    -Discussed options with the patient and her daughter.  We decided to hold off on further work-up with a longer monitor such as a ZIO.  She will work on hydration.  If she continues to have recurrent symptoms despite improved  hydration and coming off the Wellbutrin will consider further work-up at that point.  -She can be discharged this morning and keep her scheduled outpatient appointment with Dr. Perry in 1 month.    Suzanna Ramos MD  03/10/22  08:15 EST

## 2022-03-10 NOTE — PLAN OF CARE
Goal Outcome Evaluation:   Pt rested well through the night, pt did not have any tachycardia episodes, after speaking with the pt, pt described a near syncopal event occurring on 3/5/22 and after this she noticed her heart racing, pt reports she believes the elevated heart rates have to do with anxiety. Pt denies any further complaints, denies questions or concerns, pt to follow up with cardio in the AM. A&O x4, NSR on the monitor, and ambulatory. RN to continue to monitor.

## 2022-03-10 NOTE — PLAN OF CARE
Goal Outcome Evaluation:  Plan of Care Reviewed With: patient        Progress: improving  Outcome Evaluation: Pt to be discharged. AVS reviewed and IV removed. Pt aware of recommended follow ups. No RX given. NAD noted at this time. Questions answered. Will continue to monitor until pt has left department.

## 2022-03-11 ENCOUNTER — TRANSITIONAL CARE MANAGEMENT TELEPHONE ENCOUNTER (OUTPATIENT)
Dept: CALL CENTER | Facility: HOSPITAL | Age: 57
End: 2022-03-11

## 2022-03-11 NOTE — OUTREACH NOTE
Call Center TCM Note    Flowsheet Row Responses   [facility] patient discharged from? [place]   Does the patient have one of the following disease processes/diagnoses(primary or secondary)? Other   TCM attempt successful? Yes   Call start time 0940   Call end time 0942   Discharge diagnosis Tachycardia-resolved   Person spoke with today (if not patient) and relationship patient   Meds reviewed with patient/caregiver? Yes   Is the patient having any side effects they believe may be caused by any medication additions or changes? No   Does the patient have all medications ordered at discharge? N/A   Is the patient taking all medications as directed (includes completed medication regime)? Yes   Does the patient have a primary care provider?  Yes   Does the patient have an appointment with their PCP within 7 days of discharge? No   What is preventing the patient from scheduling follow up appointments within 7 days of discharge? Earlier appointment not available   Nursing Interventions Educated patient on importance of making appointment, Advised patient to make appointment   Has the patient kept scheduled appointments due by today? N/A   Psychosocial issues? No   Did the patient receive a copy of their discharge instructions? Yes   Nursing interventions Reviewed instructions with patient   What is the patient's perception of their health status since discharge? Improving   Is the patient/caregiver able to teach back signs and symptoms related to disease process for when to call PCP? Yes   Is the patient/caregiver able to teach back signs and symptoms related to disease process for when to call 911? Yes   Is the patient/caregiver able to teach back the hierarchy of who to call/visit for symptoms/problems? PCP, Specialist, Home health nurse, Urgent Care, ED, 911 Yes   If the patient is a current smoker, are they able to teach back resources for cessation? Not a smoker   TCM call completed? Yes   Wrap up additional  comments Pt states she is doing better with no episodes of tachycardia. No questions/concerns.          Sarah Gonzalez RN    3/11/2022, 09:45 EST

## 2022-03-16 ENCOUNTER — TELEMEDICINE (OUTPATIENT)
Dept: FAMILY MEDICINE CLINIC | Facility: CLINIC | Age: 57
End: 2022-03-16

## 2022-03-16 VITALS
SYSTOLIC BLOOD PRESSURE: 147 MMHG | OXYGEN SATURATION: 97 % | TEMPERATURE: 97.9 F | HEART RATE: 89 BPM | DIASTOLIC BLOOD PRESSURE: 86 MMHG

## 2022-03-16 DIAGNOSIS — F41.0 PANIC ATTACKS: Primary | ICD-10-CM

## 2022-03-16 DIAGNOSIS — R00.0 TACHYCARDIA: ICD-10-CM

## 2022-03-16 PROCEDURE — 99496 TRANSJ CARE MGMT HIGH F2F 7D: CPT | Performed by: NURSE PRACTITIONER

## 2022-03-16 NOTE — PROGRESS NOTES
"Chief Complaint  Hospital Follow Up Visit (ER f/u panic attack )    Subjective          Stacy Salomon presents to [facility]  History of Present Illness new pt to me.  Scheduled a televisit for Er and 1 night hospital stay, 3-9-22 thru 3-10-22 for what turned out to be panic attack.  She had similar event the week prior with ER visit as she was starting wellbutrin, day # 4, which she had previously tolerated.    Says she was \"for sure having heart attack and dying\".  This began as she had weaned off wellbutrin.  Started with feeling whooshing in her brain and then she developed tachycardia and mild chest discomfort.  At prior ER visit had also had similar but had syncopal feeling associated with it.    At this visit she was seen by cardiology and had an echo since she has had chronic murmur.  She had 24 hr holter.  Has cardiology follow up in couple of weeks.   No symptoms since.    While processing events, she realizes that she may have been thrown into panic attack due to spouse's sudden MI and death.     Objective   Vital Signs:   /86   Pulse 89   Temp 97.9 °F (36.6 °C)   SpO2 97%     Physical Exam  Limited by video visit.  She is well appearing and does not seem to be distressed.  She seems alert and oriented and her mood and affect are normal, good historian of medical history. Seems to have good self insight.  No cough or dyspnea appreciated, able to complete sentences without problem.     Result Review :                 Assessment and Plan    Diagnoses and all orders for this visit:    1. Panic attacks (Primary)    2. Tachycardia      Troponin was negative, cbc grossly normal, cmp normal, TSH was normal.  Her CXR was normal.. her EKG showed no acute ST/T wave abnl.  Echo shows mild hypertrophy, grade 1 LV diastolic dysfunction.  She is now off the wellbutrin for over 1 week and has no plans to restart.  She declines BB which we discussed can be helpful for future " episodes.  She will schedule with counselor.  She plans to keep appt with cardiology for f/u.   Meds reconciled.          Follow Up   No follow-ups on file.  Patient was given instructions and counseling regarding her condition or for health maintenance advice. Please see specific information pulled into the AVS if appropriate.

## 2022-03-18 LAB — QT INTERVAL: 368 MS

## 2022-03-29 ENCOUNTER — NURSE TRIAGE (OUTPATIENT)
Dept: CALL CENTER | Facility: HOSPITAL | Age: 57
End: 2022-03-29

## 2022-03-29 NOTE — TELEPHONE ENCOUNTER
[facility]    BP & HA issues    158/94    Reason for Disposition  • [1] Systolic BP  >= 130 OR Diastolic >= 80 AND [2] not taking BP medications    Additional Information  • Negative: Difficult to awaken or acting confused (e.g., disoriented, slurred speech)  • Negative: SEVERE difficulty breathing (e.g., struggling for each breath, speaks in single words)  • Negative: [1] Weakness of the face, arm or leg on one side of the body AND [2] new-onset  • Negative: [1] Numbness (i.e., loss of sensation) of the face, arm or leg on one side of the body AND [2] new-onset  • Negative: [1] Chest pain lasts > 5 minutes AND [2] history of heart disease  (i.e., heart attack, bypass surgery, angina, angioplasty, CHF)  • Negative: [1] Chest pain AND [2] took nitrogylcerin AND [3] pain was not relieved  • Negative: Sounds like a life-threatening emergency to the triager  • Negative: Symptom is main concern  (e.g., headache, chest pain)  • Negative: Low blood pressure is main concern  • Negative: [1] Systolic BP  >= 160 OR Diastolic >= 100 AND [2] cardiac or neurologic symptoms (e.g., chest pain, difficulty breathing, unsteady gait, blurred vision)  • Negative: [1] Pregnant 20 or more weeks (or postpartum < 6 weeks) AND [2] new hand or face swelling  • Negative: [1] Pregnant 20 or more weeks AND [2] Systolic BP  >= 140 OR Diastolic >= 90  • Negative: [1] Systolic BP  >= 200 OR Diastolic >= 120  AND [2] having NO cardiac or neurologic symptoms  • Negative: [1] Postpartum < 6 weeks AND [2] Systolic BP  >= 140 OR Diastolic >= 90  • Negative: [1] Systolic BP  >= 180 OR Diastolic >= 110 AND [2] missed most recent dose of blood pressure medication  • Negative: Systolic BP  >= 180 OR Diastolic >= 110  • Negative: Ran out of BP medications  • Negative: Systolic BP  >= 160 OR Diastolic >= 100  • Negative: [1] Taking BP medications AND [2] feels is having side effects (e.g., impotence, cough, dizzy upon standing)  • Negative: [1] Systolic BP  >= 130  "OR Diastolic >= 80 AND [2] pregnant  • Negative: [1] Systolic BP  >= 130 OR Diastolic >= 80 AND [2] taking BP medications    Answer Assessment - Initial Assessment Questions  1. BLOOD PRESSURE: \"What is the blood pressure?\" \"Did you take at least two measurements 5 minutes apart?\"      158/94  2. ONSET: \"When did you take your blood pressure?\"      15 min ago  3. HOW: \"How did you obtain the blood pressure?\" (e.g., visiting nurse, automatic home BP monitor)      automatic  4. HISTORY: \"Do you have a history of high blood pressure?\"      Yes and anxiety  5. MEDICATIONS: \"Are you taking any medications for blood pressure?\" \"Have you missed any doses recently?\"      buspar  6. OTHER SYMPTOMS: \"Do you have any symptoms?\" (e.g., headache, chest pain, blurred vision, difficulty breathing, weakness)      Slight headache, sinus issues; states she has been seen in ED for panic attacks recently  7. PREGNANCY: \"Is there any chance you are pregnant?\" \"When was your last menstrual period?\"      na    Protocols used: BLOOD PRESSURE - HIGH-ADULT-AH      "

## 2022-03-30 ENCOUNTER — CLINICAL SUPPORT (OUTPATIENT)
Dept: FAMILY MEDICINE CLINIC | Facility: CLINIC | Age: 57
End: 2022-03-30

## 2022-03-30 VITALS — HEART RATE: 73 BPM | SYSTOLIC BLOOD PRESSURE: 145 MMHG | DIASTOLIC BLOOD PRESSURE: 83 MMHG

## 2022-03-30 DIAGNOSIS — Z01.30 BP CHECK: ICD-10-CM

## 2022-03-30 DIAGNOSIS — T88.7XXA MEDICATION SIDE EFFECT: ICD-10-CM

## 2022-03-30 DIAGNOSIS — I10 ESSENTIAL HYPERTENSION: Primary | ICD-10-CM

## 2022-03-30 DIAGNOSIS — R55 NEAR SYNCOPE: ICD-10-CM

## 2022-03-30 NOTE — PROGRESS NOTES
CLIFFORD CAME IN FOR BP CHECK TODAY. PTS BP /83. PTS BP THIS MORNING /70. I INFORMED PT TO CHECK BP DAILY AND SIT 5 MINUTES BEFORE CHECKING AND LET DR GAY KNOW WHAT BP IS RUNNING. PT IS COMING IN FOR FOLLOW UP EARLY NEXT WEEK. PT IS KEEPING LOG OF BP.

## 2022-03-31 LAB
ALBUMIN SERPL-MCNC: 4.5 G/DL (ref 3.8–4.9)
ALBUMIN/GLOB SERPL: 1.7 {RATIO} (ref 1.2–2.2)
ALP SERPL-CCNC: 85 IU/L (ref 44–121)
ALT SERPL-CCNC: 20 IU/L (ref 0–32)
AST SERPL-CCNC: 15 IU/L (ref 0–40)
BILIRUB SERPL-MCNC: 0.4 MG/DL (ref 0–1.2)
BUN SERPL-MCNC: 16 MG/DL (ref 6–24)
BUN/CREAT SERPL: 25 (ref 9–23)
CALCIUM SERPL-MCNC: 9.5 MG/DL (ref 8.7–10.2)
CHLORIDE SERPL-SCNC: 101 MMOL/L (ref 96–106)
CO2 SERPL-SCNC: 25 MMOL/L (ref 20–29)
CREAT SERPL-MCNC: 0.64 MG/DL (ref 0.57–1)
EGFRCR SERPLBLD CKD-EPI 2021: 105 ML/MIN/1.73
GLOBULIN SER CALC-MCNC: 2.6 G/DL (ref 1.5–4.5)
GLUCOSE SERPL-MCNC: 95 MG/DL (ref 65–99)
POTASSIUM SERPL-SCNC: 4.1 MMOL/L (ref 3.5–5.2)
PROT SERPL-MCNC: 7.1 G/DL (ref 6–8.5)
SODIUM SERPL-SCNC: 140 MMOL/L (ref 134–144)

## 2022-04-04 RX ORDER — HYDROCHLOROTHIAZIDE 25 MG/1
TABLET ORAL
Qty: 90 TABLET | Refills: 3 | Status: SHIPPED | OUTPATIENT
Start: 2022-04-04 | End: 2022-07-28 | Stop reason: SDUPTHER

## 2022-04-04 NOTE — TELEPHONE ENCOUNTER
Rx Refill Note  Requested Prescriptions     Pending Prescriptions Disp Refills   • hydroCHLOROthiazide (HYDRODIURIL) 25 MG tablet [Pharmacy Med Name: HYDROCHLOROTHIAZIDE TABS 25MG] 90 tablet 3     Sig: TAKE 1 TABLET DAILY      Last office visit with prescribing clinician: 3/7/2022      Next office visit with prescribing clinician: 4/5/2022            Miky Churchill  04/04/22, 07:54 EDT

## 2022-04-05 ENCOUNTER — OFFICE VISIT (OUTPATIENT)
Dept: FAMILY MEDICINE CLINIC | Facility: CLINIC | Age: 57
End: 2022-04-05

## 2022-04-05 VITALS
DIASTOLIC BLOOD PRESSURE: 86 MMHG | WEIGHT: 236.7 LBS | HEART RATE: 96 BPM | BODY MASS INDEX: 41.94 KG/M2 | SYSTOLIC BLOOD PRESSURE: 128 MMHG | HEIGHT: 63 IN | TEMPERATURE: 98 F | OXYGEN SATURATION: 97 %

## 2022-04-05 DIAGNOSIS — R00.0 TACHYCARDIA: ICD-10-CM

## 2022-04-05 DIAGNOSIS — R55 NEAR SYNCOPE: ICD-10-CM

## 2022-04-05 DIAGNOSIS — I10 ESSENTIAL HYPERTENSION: Primary | ICD-10-CM

## 2022-04-05 DIAGNOSIS — F41.0 PANIC ATTACKS: ICD-10-CM

## 2022-04-05 PROCEDURE — 99214 OFFICE O/P EST MOD 30 MIN: CPT | Performed by: FAMILY MEDICINE

## 2022-04-05 RX ORDER — METOPROLOL SUCCINATE 25 MG/1
25 TABLET, EXTENDED RELEASE ORAL DAILY
Qty: 90 TABLET | Refills: 1 | Status: SHIPPED | OUTPATIENT
Start: 2022-04-05 | End: 2022-10-10

## 2022-04-05 RX ORDER — VIT C/B6/B5/MAGNESIUM/HERB 173 50-5-6-5MG
CAPSULE ORAL DAILY
COMMUNITY

## 2022-04-05 RX ORDER — ALPRAZOLAM 0.25 MG/1
0.25 TABLET ORAL 2 TIMES DAILY PRN
Qty: 10 TABLET | Refills: 0 | Status: SHIPPED | OUTPATIENT
Start: 2022-04-05

## 2022-04-05 NOTE — PROGRESS NOTES
"Answers for HPI/ROS submitted by the patient on 4/3/2022  What is the primary reason for your visit?: High Blood Pressure    Chief Complaint  Hypertension    Subjective          Stacy Salomon presents to [facility]  History of Present Illness     Follow-up hospitalization, hypertension, presyncopal episodes, anxiety and panic attacks.  Her blood pressures been labile.  Up to 170 systolic.  She states she gets very fearful of her blood pressure being elevated and this does not help her she states.  She has had some ongoing anxiety and mild depression symptoms.  This all mostly started when the bupropion was added for her depression.  I reviewed her hospitalization notes.  Her echocardiogram was essentially normal.  Holter monitor was normal.  Labs overall normal.  She continues on hydrochlorothiazide 25 mg a day.  She has had no recurrent syncopal episodes.  No tunnel vision.  She said some mild headaches.  She otherwise is feeling well.    Objective   Vital Signs:   /86   Pulse 96   Temp 98 °F (36.7 °C) (Temporal)   Ht 160 cm (63\")   Wt 107 kg (236 lb 11.2 oz)   SpO2 97%   BMI 41.93 kg/m²            Physical Exam  Vitals and nursing note reviewed.   Constitutional:       General: She is not in acute distress.     Appearance: She is well-developed.   Cardiovascular:      Rate and Rhythm: Normal rate and regular rhythm.      Heart sounds: Normal heart sounds.   Pulmonary:      Effort: Pulmonary effort is normal.      Breath sounds: Normal breath sounds.   Musculoskeletal:      Cervical back: Normal range of motion.   Skin:     General: Skin is warm and dry.   Psychiatric:      Comments: Mood is mildly anxious        Result Review :   The following data was reviewed by: Parrish Hawkins MD on 04/05/2022:  Common labs    Common Labsle 3/9/22 3/9/22 3/10/22 3/10/22 3/30/22    1450 1450 0518 0518    Glucose  102 (A)  106 (A) 95   BUN  16  17 16   Creatinine  0.73  0.63 0.64 " "  Sodium  139  138 140   Potassium  3.6  4.2 4.1   Chloride  100  103 101   Calcium  9.1  9.0 9.5   Total Protein     7.1   Albumin  3.90   4.5   Total Bilirubin  0.3   0.4   Alkaline Phosphatase  94   85   AST (SGOT)  15   15   ALT (SGPT)  20   20   WBC 10.39  9.33     Hemoglobin 13.7  13.1     Hematocrit 40.2  39.6     Platelets 352  326     (A) Abnormal value       Comments are available for some flowsheets but are not being displayed.           Data reviewed: Recent hospitalization notes Cardiac consultation notes, Holter monitor, echocardiogram.  Also labs.          Assessment and Plan    Diagnoses and all orders for this visit:    1. Essential hypertension (Primary)    2. Panic attacks    3. Near syncope    4. Tachycardia    Other orders  -     metoprolol succinate XL (Toprol XL) 25 MG 24 hr tablet; Take 1 tablet by mouth Daily.  Dispense: 90 tablet; Refill: 1  -     ALPRAZolam (XANAX) 0.25 MG tablet; Take 1 tablet by mouth 2 (Two) Times a Day As Needed for Anxiety.  Dispense: 10 tablet; Refill: 0      Hospitalization follow-up.  Hypertensive episodes.  Panic attacks.  Near syncope.  Likely combination of vasovagal syncope and the panic attacks.  Tachycardia likely related to the panic attacks.  Possibly from the hypertensive episodes.  She will continue the hydrochlorothiazide.  I am adding a low-dose beta-blocker 25 mg daily metoprolol.  This should help break some of her anxiety cycles.  I am adding alprazolam 0.25 mg to take once or twice a day as needed for severe anxiety.  Number 10 pills with no refills.  She understands this medication can cause sedation.  Not drive or take heavy machinery while taking.  In addition has abuse potential.  I would not recommend long-term.  Just for short-term use, for \"break the glass\".  I want to see her back in 6 weeks for follow-up.  She will continue to monitor her blood pressure and let me know how they are doing.        I spent 30 minutes caring for Stacy on this " date of service. This time includes time spent by me in the following activities:preparing for the visit, reviewing tests, performing a medically appropriate examination and/or evaluation , counseling and educating the patient/family/caregiver, ordering medications, tests, or procedures and documenting information in the medical record  Follow Up   No follow-ups on file.  Patient was given instructions and counseling regarding her condition or for health maintenance advice. Please see specific information pulled into the AVS if appropriate.

## 2022-04-07 ENCOUNTER — HOSPITAL ENCOUNTER (OUTPATIENT)
Dept: CARDIOLOGY | Facility: HOSPITAL | Age: 57
Discharge: HOME | End: 2022-04-07
Admitting: INTERNAL MEDICINE

## 2022-04-07 ENCOUNTER — OFFICE VISIT (OUTPATIENT)
Dept: CARDIOLOGY | Facility: CLINIC | Age: 57
End: 2022-04-07

## 2022-04-07 VITALS
HEIGHT: 63 IN | WEIGHT: 237 LBS | DIASTOLIC BLOOD PRESSURE: 84 MMHG | BODY MASS INDEX: 41.99 KG/M2 | SYSTOLIC BLOOD PRESSURE: 128 MMHG

## 2022-04-07 DIAGNOSIS — R00.0 TACHYCARDIA: ICD-10-CM

## 2022-04-07 DIAGNOSIS — R09.89 LABILE BLOOD PRESSURE: ICD-10-CM

## 2022-04-07 DIAGNOSIS — R29.818 SUSPECTED SLEEP APNEA: ICD-10-CM

## 2022-04-07 DIAGNOSIS — R01.1 MURMUR, HEART: ICD-10-CM

## 2022-04-07 DIAGNOSIS — R55 NEAR SYNCOPE: ICD-10-CM

## 2022-04-07 DIAGNOSIS — R55 NEAR SYNCOPE: Primary | ICD-10-CM

## 2022-04-07 LAB
BH CV STRESS BP STAGE 1: NORMAL
BH CV STRESS BP STAGE 2: NORMAL
BH CV STRESS DURATION MIN STAGE 1: 3
BH CV STRESS DURATION MIN STAGE 2: 3
BH CV STRESS DURATION SEC STAGE 1: 0
BH CV STRESS DURATION SEC STAGE 2: 0
BH CV STRESS GRADE STAGE 1: 10
BH CV STRESS GRADE STAGE 2: 12
BH CV STRESS HR STAGE 1: 132
BH CV STRESS HR STAGE 2: 156
BH CV STRESS METS STAGE 1: 5
BH CV STRESS METS STAGE 2: 7.5
BH CV STRESS PROTOCOL 1: NORMAL
BH CV STRESS RECOVERY BP: NORMAL MMHG
BH CV STRESS RECOVERY HR: 95 BPM
BH CV STRESS SPEED STAGE 1: 1.7
BH CV STRESS SPEED STAGE 2: 2.5
BH CV STRESS STAGE 1: 1
BH CV STRESS STAGE 2: 2
MAXIMAL PREDICTED HEART RATE: 166 BPM
PERCENT MAX PREDICTED HR: 93.98 %
STRESS BASELINE BP: NORMAL MMHG
STRESS BASELINE HR: 90 BPM
STRESS PERCENT HR: 111 %
STRESS POST ESTIMATED WORKLOAD: 7.5 METS
STRESS POST EXERCISE DUR MIN: 6 MIN
STRESS POST EXERCISE DUR SEC: 0 SEC
STRESS POST PEAK BP: NORMAL MMHG
STRESS POST PEAK HR: 156 BPM
STRESS TARGET HR: 141 BPM

## 2022-04-07 PROCEDURE — 93018 CV STRESS TEST I&R ONLY: CPT | Performed by: INTERNAL MEDICINE

## 2022-04-07 PROCEDURE — 93000 ELECTROCARDIOGRAM COMPLETE: CPT | Performed by: INTERNAL MEDICINE

## 2022-04-07 PROCEDURE — 99214 OFFICE O/P EST MOD 30 MIN: CPT | Performed by: INTERNAL MEDICINE

## 2022-04-07 PROCEDURE — 93016 CV STRESS TEST SUPVJ ONLY: CPT | Performed by: INTERNAL MEDICINE

## 2022-04-07 PROCEDURE — 93017 CV STRESS TEST TRACING ONLY: CPT

## 2022-04-07 NOTE — PROGRESS NOTES
Date of Office Visit: 22  Encounter Provider: Broderick Perry MD  Place of Service: [facility] CARDIOLOGY  Patient Name: Stacy Salomon  :1965    Chief Complaint   Patient presents with   • Syncope   :     HPI:     Ms. Salomon is 56 y.o. and presents today for evaluation after a recent hospital stay. She was actually evaluated by Dr Ramos during that admission.    She has a history of hypertension and had been on HCTZ for many years. Her   suddenly of a heart attack a few years ago, and that understandably makes her very worried of heart disease. She works from home as an , and unfortunately doesn't get much exercise. She has had some worsening anxieties.    She started bupropion on . Four days later, she was at [employer], and got an abrupt sensation of everything closing in/tunnel vision, and felt like she may pass out. She called for help, and after a few minutes, her symptoms resolved. When she got home, her BP was 160/101mm Hg. She went to Urgent Care, who referred her to the ED. They offered her observation but she chose to go home. She stopped buproprion. Two days ago, while on a work call, the same symptoms started. She checked her BP and it was 180/99mm Hg, and her pulse was 135 bpm. She kept checking her vitals over and over. By the time she came to the ED, she was in full blown panic mode (hyperventilating, shaking). She was observed overnight; her labs, EKG, telemetry were normal. An echo showed normal LVSF; the valves were not well visualized but there was no stenosis or regurgitation by doppler. She reports that a murmur had been heard previously.    She saw Dr Hawkins, who recommended occasional BP checking but not frequent. He started metoprolol succinate, 25mg daily. This has helped greatly. She's doing much better today. She doesn't have chest pain. She gets appropriately winded with exercise. She does not have edema or orthopnea. She does  not have palpitations.     Past Medical History:   Diagnosis Date   • Anxiety    • Hypertension        Past Surgical History:   Procedure Laterality Date   • APPENDECTOMY  1978   • CHOLECYSTECTOMY  1993   • COLONOSCOPY N/A 12/17/2020    Procedure: COLONOSCOPY TO CECUM;  Surgeon: Jesus Rasheed MD;  Location: [facility] ENDOSCOPY;  Service: General;  Laterality: N/A;  PRE-SCREENING  POST- DIVERTICULOSIS   • MOLE REMOVAL         Social History     Socioeconomic History   • Marital status:    • Number of children: 2   Tobacco Use   • Smoking status: Passive Smoke Exposure - Never Smoker   • Smokeless tobacco: Never Used   Vaping Use   • Vaping Use: Never used   Substance and Sexual Activity   • Alcohol use: Yes     Comment: 2 glasses of wine/ month.   2 beers / month.  1 cocktail/mon   • Drug use: No   • Sexual activity: Never       Family History   Problem Relation Age of Onset   • No Known Problems Mother    • Heart disease Father    • Hyperlipidemia Father    • Diabetes Father    • Diabetes Sister    • Cancer Paternal Aunt    • Cancer Paternal Aunt         Her2 positive   • Cancer Paternal Aunt    • Arthritis Maternal Grandmother    • COPD Maternal Grandmother        Review of Systems   Psychiatric/Behavioral: The patient is nervous/anxious.    All other systems reviewed and are negative.      No Known Allergies      Current Outpatient Medications:   •  albuterol sulfate  (90 Base) MCG/ACT inhaler, Inhale 2 puffs Every 4 (Four) Hours As Needed for Wheezing., Disp: 1 inhaler, Rfl: 0  •  ALPRAZolam (XANAX) 0.25 MG tablet, Take 1 tablet by mouth 2 (Two) Times a Day As Needed for Anxiety., Disp: 10 tablet, Rfl: 0  •  calcium carb-cholecalciferol 600-800 MG-UNIT tablet, Take  by mouth., Disp: , Rfl:   •  Cetirizine HCl 10 MG capsule, Take  by mouth., Disp: , Rfl:   •  Collagenase powder, , Disp: , Rfl:   •  fluticasone (Flovent HFA) 110 MCG/ACT inhaler, Inhale 1 puff 2 (Two) Times a Day., Disp: 1 inhaler,  "Rfl: 0  •  hydroCHLOROthiazide (HYDRODIURIL) 25 MG tablet, TAKE 1 TABLET DAILY, Disp: 90 tablet, Rfl: 3  •  metoprolol succinate XL (Toprol XL) 25 MG 24 hr tablet, Take 1 tablet by mouth Daily., Disp: 90 tablet, Rfl: 1  •  multivitamin (THERAGRAN) tablet tablet, Take  by mouth Daily., Disp: , Rfl:   •  Turmeric 500 MG capsule, Take  by mouth Daily., Disp: , Rfl:   •  vitamin E 400 UNIT capsule, Take 800 Units by mouth., Disp: , Rfl:       Objective:     Vitals:    04/07/22 1414   BP: 128/84   BP Location: Left arm   Weight: 108 kg (237 lb)   Height: 160 cm (63\")     Body mass index is 41.98 kg/m².    Constitutional:       Appearance: Not in distress.   Eyes:      Conjunctiva/sclera: Conjunctivae normal.   HENT:         Comments: masked  Neck:      Vascular: JVD normal.      Lymphadenopathy: No cervical adenopathy.   Pulmonary:      Effort: Pulmonary effort is normal.      Breath sounds: Normal breath sounds.   Cardiovascular:      Normal rate. Regular rhythm.      Murmurs: There is no murmur.   Pulses:     Intact distal pulses.   Edema:     Peripheral edema absent.   Abdominal:      Palpations: Abdomen is soft.      Tenderness: There is no abdominal tenderness.   Musculoskeletal: Normal range of motion. Skin:     General: Skin is warm and dry.   Neurological:      General: No focal deficit present.      Mental Status: Alert.           ECG 12 Lead    Date/Time: 4/7/2022 4:06 PM  Performed by: Broderick Perry MD  Authorized by: Broderick Perry MD   Comparison: compared with previous ECG   Similar to previous ECG  Rhythm: sinus rhythm  Conduction: conduction normal  ST Segments: ST segments normal  T Waves: T waves normal  QRS axis: normal  Other: no other findings    Clinical impression: normal ECG              Assessment:       Diagnosis Plan   1. Near syncope  Treadmill Stress Test    Holter Monitor - 72 Hour Up To 15 Days   2. Labile blood pressure  Treadmill Stress Test   3. Tachycardia  Treadmill Stress Test   4. " Murmur, heart     5. Suspected sleep apnea  Home Sleep Study          Plan:       Mrs Salomon experienced two episodes of near syncope associated with rapid heart rate and blood pressure. This may have been precipitated by the initiation of buproprion. However, she has a lot of stressors and seems to have a hyperadrenergic state.     I agree completely with metoprolol.  In order to rule out an arrhythmia, I put her on the treadmill today. The stress was normal and there were no rhythm issues. I will get a 14 day monitor.    I suspect her murmur was a flow murmur due to increased cardiac output. I do not hear a murmur today and her echo is unremarkable.     I have ordered a home sleep study as untreated sleep apnea could certainly be contributing to her symptoms.     Sincerely,       Broderick Perry MD

## 2022-05-06 ENCOUNTER — HOSPITAL ENCOUNTER (OUTPATIENT)
Dept: MRI IMAGING | Facility: HOSPITAL | Age: 57
Discharge: HOME | End: 2022-05-06
Admitting: OBSTETRICS & GYNECOLOGY

## 2022-05-06 DIAGNOSIS — Z80.3 FAMILY HISTORY OF BREAST CANCER: ICD-10-CM

## 2022-05-06 PROCEDURE — 82565 ASSAY OF CREATININE: CPT

## 2022-05-06 PROCEDURE — 77049 MRI BREAST C-+ W/CAD BI: CPT

## 2022-05-06 PROCEDURE — A9577 INJ MULTIHANCE: HCPCS | Performed by: OBSTETRICS & GYNECOLOGY

## 2022-05-06 PROCEDURE — 0 GADOBENATE DIMEGLUMINE 529 MG/ML SOLUTION: Performed by: OBSTETRICS & GYNECOLOGY

## 2022-05-06 RX ADMIN — GADOBENATE DIMEGLUMINE 20 ML: 529 INJECTION, SOLUTION INTRAVENOUS at 08:28

## 2022-05-07 LAB — CREAT BLDA-MCNC: 0.6 MG/DL (ref 0.6–1.3)

## 2022-05-10 ENCOUNTER — HOSPITAL ENCOUNTER (OUTPATIENT)
Dept: SLEEP MEDICINE | Facility: HOSPITAL | Age: 57
Discharge: HOME | End: 2022-05-10
Admitting: INTERNAL MEDICINE

## 2022-05-10 DIAGNOSIS — R29.818 SUSPECTED SLEEP APNEA: ICD-10-CM

## 2022-05-10 PROCEDURE — 95806 SLEEP STUDY UNATT&RESP EFFT: CPT

## 2022-05-10 PROCEDURE — 95806 SLEEP STUDY UNATT&RESP EFFT: CPT | Performed by: INTERNAL MEDICINE

## 2022-05-23 ENCOUNTER — TELEPHONE (OUTPATIENT)
Dept: SLEEP MEDICINE | Facility: HOSPITAL | Age: 57
End: 2022-05-23

## 2022-05-25 ENCOUNTER — OFFICE VISIT (OUTPATIENT)
Dept: FAMILY MEDICINE CLINIC | Facility: CLINIC | Age: 57
End: 2022-05-25

## 2022-05-25 VITALS
DIASTOLIC BLOOD PRESSURE: 76 MMHG | OXYGEN SATURATION: 99 % | TEMPERATURE: 96.8 F | SYSTOLIC BLOOD PRESSURE: 129 MMHG | WEIGHT: 233.5 LBS | HEIGHT: 63 IN | BODY MASS INDEX: 41.37 KG/M2 | HEART RATE: 69 BPM

## 2022-05-25 DIAGNOSIS — I10 ESSENTIAL HYPERTENSION: Primary | ICD-10-CM

## 2022-05-25 PROCEDURE — 99213 OFFICE O/P EST LOW 20 MIN: CPT | Performed by: FAMILY MEDICINE

## 2022-05-25 NOTE — PROGRESS NOTES
"Chief Complaint  Hypertension    Subjective          Stacy Salomon presents to [facility]  History of Present Illness    6-week follow-up hypertension.  Started on metoprolol extended release 25 mg a day her blood pressure she states got much better.  She has been on hydrochlorothiazide 25 mg a day for a long time.  She did see the cardiologist.  Stress test was negative.  She has not had a Holter monitor done.  She has had no palpitations or other issues since starting the metoprolol.  She has some diarrhea initially but then she changed her diet and that improved.  She is also taking some supplements through a weight loss doctor but not taking any prescription medication at this time.  Her BMI is over 40.  Her mood is improved.  Less stressors she states.    Objective   Vital Signs:  /76   Pulse 69   Temp 96.8 °F (36 °C) (Temporal)   Ht 160 cm (63\")   Wt 106 kg (233 lb 8 oz)   SpO2 99%   BMI 41.36 kg/m²         Physical Exam  Vitals and nursing note reviewed.   Constitutional:       General: She is not in acute distress.     Appearance: She is well-developed. She is obese.   Cardiovascular:      Rate and Rhythm: Normal rate and regular rhythm.      Heart sounds: Normal heart sounds.   Pulmonary:      Effort: Pulmonary effort is normal.      Breath sounds: Normal breath sounds.   Abdominal:      General: There is no distension.      Palpations: Abdomen is soft.      Tenderness: There is no abdominal tenderness.   Musculoskeletal:      Cervical back: Normal range of motion. No tenderness.   Lymphadenopathy:      Cervical: No cervical adenopathy.   Skin:     General: Skin is warm and dry.   Psychiatric:         Mood and Affect: Mood normal.        Result Review :   The following data was reviewed by: Parrish Hawkins MD on 05/25/2022:  Common labs    Common Labsle 3/10/22 3/10/22 3/30/22 5/6/22    0518 0518     Glucose  106 (A) 95    BUN  17 16    Creatinine  0.63 0.64 0.60 "   Sodium  138 140    Potassium  4.2 4.1    Chloride  103 101    Calcium  9.0 9.5    Total Protein   7.1    Albumin   4.5    Total Bilirubin   0.4    Alkaline Phosphatase   85    AST (SGOT)   15    ALT (SGPT)   20    WBC 9.33      Hemoglobin 13.1      Hematocrit 39.6      Platelets 326      (A) Abnormal value       Comments are available for some flowsheets but are not being displayed.           Data reviewed: Consultant notes Reviewed cardiology notes and cardiology studies          Assessment and Plan    Diagnoses and all orders for this visit:    1. Essential hypertension (Primary)  -     CBC & Differential; Future  -     Comprehensive Metabolic Panel; Future  -     Lipid Panel; Future      Hypertension.  Better control.  He is going to continue the metoprolol and hydrochlorothiazide.  I will see her back in about 6 months for annual complete physical examination with lab work prior.  The anxiety and panic attacks are now resolved.  With new concerns or other problems she will let us know.       Follow Up   No follow-ups on file.  Patient was given instructions and counseling regarding her condition or for health maintenance advice. Please see specific information pulled into the AVS if appropriate.

## 2022-06-30 ENCOUNTER — OFFICE VISIT (OUTPATIENT)
Dept: SLEEP MEDICINE | Facility: HOSPITAL | Age: 57
End: 2022-06-30

## 2022-06-30 VITALS
SYSTOLIC BLOOD PRESSURE: 126 MMHG | BODY MASS INDEX: 37.56 KG/M2 | DIASTOLIC BLOOD PRESSURE: 68 MMHG | HEIGHT: 64 IN | WEIGHT: 220 LBS | OXYGEN SATURATION: 95 % | HEART RATE: 73 BPM

## 2022-06-30 DIAGNOSIS — G47.36 HYPOXEMIA ASSOCIATED WITH SLEEP: ICD-10-CM

## 2022-06-30 DIAGNOSIS — G47.33 OSA (OBSTRUCTIVE SLEEP APNEA): Primary | ICD-10-CM

## 2022-06-30 DIAGNOSIS — G47.14 HYPERSOMNIA DUE TO MEDICAL CONDITION: ICD-10-CM

## 2022-06-30 DIAGNOSIS — E66.01 CLASS 2 SEVERE OBESITY DUE TO EXCESS CALORIES WITH SERIOUS COMORBIDITY AND BODY MASS INDEX (BMI) OF 38.0 TO 38.9 IN ADULT: ICD-10-CM

## 2022-06-30 PROCEDURE — G0463 HOSPITAL OUTPT CLINIC VISIT: HCPCS

## 2022-06-30 PROCEDURE — 99204 OFFICE O/P NEW MOD 45 MIN: CPT | Performed by: INTERNAL MEDICINE

## 2022-07-02 PROBLEM — G47.33 OSA (OBSTRUCTIVE SLEEP APNEA): Status: ACTIVE | Noted: 2022-05-10

## 2022-07-02 PROBLEM — E66.01 CLASS 2 SEVERE OBESITY DUE TO EXCESS CALORIES WITH SERIOUS COMORBIDITY AND BODY MASS INDEX (BMI) OF 38.0 TO 38.9 IN ADULT: Status: ACTIVE | Noted: 2022-07-02

## 2022-07-02 PROBLEM — E66.812 CLASS 2 SEVERE OBESITY DUE TO EXCESS CALORIES WITH SERIOUS COMORBIDITY AND BODY MASS INDEX (BMI) OF 38.0 TO 38.9 IN ADULT: Status: ACTIVE | Noted: 2022-07-02

## 2022-07-02 PROBLEM — G47.36 HYPOXEMIA ASSOCIATED WITH SLEEP: Status: ACTIVE | Noted: 2022-07-02

## 2022-07-02 PROBLEM — G47.14 HYPERSOMNIA DUE TO MEDICAL CONDITION: Status: ACTIVE | Noted: 2022-07-02

## 2022-07-05 ENCOUNTER — TELEPHONE (OUTPATIENT)
Dept: SLEEP MEDICINE | Facility: HOSPITAL | Age: 57
End: 2022-07-05

## 2022-07-05 NOTE — TELEPHONE ENCOUNTER
Order faxed to [facility].  Patient will call once she receives her CPAP to schedule her compliance follow up.

## 2022-07-29 RX ORDER — HYDROCHLOROTHIAZIDE 25 MG/1
25 TABLET ORAL DAILY
Qty: 90 TABLET | Refills: 1 | Status: SHIPPED | OUTPATIENT
Start: 2022-07-29 | End: 2023-01-31

## 2022-07-29 NOTE — TELEPHONE ENCOUNTER
Rx Refill Note  Requested Prescriptions     Pending Prescriptions Disp Refills   • hydroCHLOROthiazide (HYDRODIURIL) 25 MG tablet 90 tablet 1     Sig: Take 1 tablet by mouth Daily.      Last office visit with prescribing clinician: 5/25/2022      Next office visit with prescribing clinician: 11/2/2022            Miky Churchill  07/29/22, 08:27 EDT

## 2022-09-06 RX ORDER — FLUTICASONE PROPIONATE 110 UG/1
1 AEROSOL, METERED RESPIRATORY (INHALATION)
Qty: 1 EACH | Refills: 0 | Status: SHIPPED | OUTPATIENT
Start: 2022-09-06

## 2022-10-10 RX ORDER — METOPROLOL SUCCINATE 25 MG/1
25 TABLET, EXTENDED RELEASE ORAL DAILY
Qty: 90 TABLET | Refills: 1 | Status: SHIPPED | OUTPATIENT
Start: 2022-10-10 | End: 2023-03-29

## 2022-10-10 NOTE — TELEPHONE ENCOUNTER
Rx Refill Note  Requested Prescriptions     Pending Prescriptions Disp Refills   • metoprolol succinate XL (TOPROL-XL) 25 MG 24 hr tablet [Pharmacy Med Name: METOPROLOL ER SUCCINATE 25MG TABS] 90 tablet 1     Sig: TAKE 1 TABLET BY MOUTH DAILY      Last office visit with prescribing clinician: 5/25/2022      Next office visit with prescribing clinician: 11/2/2022            Kori Petty Rep  10/10/22, 10:38 EDT

## 2022-10-27 ENCOUNTER — APPOINTMENT (OUTPATIENT)
Dept: WOMENS IMAGING | Facility: HOSPITAL | Age: 57
End: 2022-10-27

## 2022-10-27 PROCEDURE — 77067 SCR MAMMO BI INCL CAD: CPT | Performed by: RADIOLOGY

## 2022-10-27 PROCEDURE — 77063 BREAST TOMOSYNTHESIS BI: CPT | Performed by: RADIOLOGY

## 2022-11-03 ENCOUNTER — OFFICE VISIT (OUTPATIENT)
Dept: SLEEP MEDICINE | Facility: HOSPITAL | Age: 57
End: 2022-11-03

## 2022-11-03 ENCOUNTER — TELEPHONE (OUTPATIENT)
Dept: SLEEP MEDICINE | Facility: HOSPITAL | Age: 57
End: 2022-11-03

## 2022-11-03 VITALS — BODY MASS INDEX: 38.93 KG/M2 | HEART RATE: 62 BPM | HEIGHT: 64 IN | OXYGEN SATURATION: 97 % | WEIGHT: 228 LBS

## 2022-11-03 DIAGNOSIS — G47.36 HYPOXEMIA ASSOCIATED WITH SLEEP: ICD-10-CM

## 2022-11-03 DIAGNOSIS — G47.14 HYPERSOMNIA DUE TO MEDICAL CONDITION: ICD-10-CM

## 2022-11-03 DIAGNOSIS — E66.01 CLASS 2 SEVERE OBESITY DUE TO EXCESS CALORIES WITH SERIOUS COMORBIDITY AND BODY MASS INDEX (BMI) OF 39.0 TO 39.9 IN ADULT: ICD-10-CM

## 2022-11-03 DIAGNOSIS — G47.33 OSA (OBSTRUCTIVE SLEEP APNEA): Primary | ICD-10-CM

## 2022-11-03 PROCEDURE — G0463 HOSPITAL OUTPT CLINIC VISIT: HCPCS

## 2022-11-03 PROCEDURE — 99213 OFFICE O/P EST LOW 20 MIN: CPT | Performed by: INTERNAL MEDICINE

## 2022-11-03 NOTE — PROGRESS NOTES
"Follow Up Sleep Disorders Center Note     Chief Complaint:  MADISON     Primary Care Physician: Parrish Hawkins MD    Interval History:   The patient is a 56 y.o. female  who I last saw 6/30/2022 and that note was reviewed.  The patient did have a home sleep study 5/10/2022 which demonstrated mild MADISON, probably underestimated, with sleep-related hypoxia.  Auto CPAP was initiated and the patient is here today for follow-up.  She states she might be improved.  She goes to bed at 11 PM and awakens at 7 AM.  She wakes up because of noise and or to use the restroom.    Review of Systems:    A complete review of systems was done and all were negative with the exception of some postnasal drip    Social History:    Social History     Socioeconomic History   • Marital status:    • Number of children: 2   Tobacco Use   • Smoking status: Passive Smoke Exposure - Never Smoker   • Smokeless tobacco: Never   Vaping Use   • Vaping Use: Never used   Substance and Sexual Activity   • Alcohol use: Yes     Comment: 2 glasses of wine/ month.   2 beers / month.  1 cocktail/mon   • Drug use: No   • Sexual activity: Never       Allergies:  Patient has no known allergies.     Medication Review:  Reviewed.  0 tach which I encouraged at bedtime vitamin E metoprolol multivitamins HCTZ    Vital Signs:    Vitals:    11/03/22 0841   Pulse: 62   SpO2: 97%   Weight: 103 kg (228 lb)   Height: 161.3 cm (63.5\")     Body mass index is 39.75 kg/m².    Physical Exam:    Constitutional:  Well developed 56 y.o. female that appears in no apparent distress.  Awake & oriented times 3.  Normal mood with normal recent and remote memory and normal judgement.  Eyes:  Conjunctivae normal.  Oropharynx: Previously, moist mucous membranes without exudate and a large tongue and normal uvula and patent posterior pharyngeal opening and class II-3 Mallampati airway, patient is wearing a facemask.    Self-administered [place] Sleepiness Scale test results: 14  0-5 " Lower normal daytime sleepiness  6-10 Higher normal daytime sleepiness  11-12 Mild, 13-15 Moderate, & 16-24 Severe excessive daytime sleepiness     Downloaded PAP Data Reviewed For Compliance:  DME is [facility] and she uses nasal pillows.  Downloads between 8/15 and 11/1/2022 compliance greater than 95%.  Average usage is 5 hours and 50 minutes.  Average AHI is normal without significant leak.  Average auto CPAP pressure is 10.1 and her ResMed auto CPAP is 8-20    I have reviewed the above results and compared them with the patient's last downloads and reviewed with the patient.    Impression:   Mild obstructive sleep apnea, probably underestimated, with sleep-related hypoxia by home sleep study 5/10/2022, weight 227 pounds, adequately treated with ResMed auto CPAP. The patient appears to be at goal with good compliance and usage. The patient has some persistent complaints of hypersomnolence.    Plan:  Good sleep hygiene measures should be maintained.  Weight loss would be beneficial in this patient who has class II severe obesity by Body mass index is 39.75 kg/m²..      After evaluating the patient and assessing results available, the patient is benefiting from the treatment being provided.     The patient will continue ResMed auto CPAP.  After clinical evaluation and review of downloads, I recommend changes to the patient's pressures.  At times, she describes to have a pressure; therefore, will decrease auto CPAP 8-12 cm water pressure.  A new prescription will be sent to the patient's DME.    I answered all of the patient's questions.  The patient will call for any problems and will follow up in 6-8 months.      Jeremy Crowell MD  Sleep Medicine  11/03/22  08:45 EDT

## 2022-11-10 ENCOUNTER — OFFICE VISIT (OUTPATIENT)
Dept: FAMILY MEDICINE CLINIC | Facility: CLINIC | Age: 57
End: 2022-11-10

## 2022-11-10 VITALS
BODY MASS INDEX: 39.94 KG/M2 | DIASTOLIC BLOOD PRESSURE: 67 MMHG | SYSTOLIC BLOOD PRESSURE: 123 MMHG | HEART RATE: 68 BPM | TEMPERATURE: 97.5 F | WEIGHT: 225.4 LBS | HEIGHT: 63 IN | OXYGEN SATURATION: 97 %

## 2022-11-10 DIAGNOSIS — Z00.00 HEALTH CARE MAINTENANCE: Primary | ICD-10-CM

## 2022-11-10 DIAGNOSIS — I10 ESSENTIAL HYPERTENSION: ICD-10-CM

## 2022-11-10 DIAGNOSIS — E66.01 CLASS 2 SEVERE OBESITY DUE TO EXCESS CALORIES WITH SERIOUS COMORBIDITY AND BODY MASS INDEX (BMI) OF 39.0 TO 39.9 IN ADULT: ICD-10-CM

## 2022-11-10 PROCEDURE — 0124A COVID-19 (PFIZER) BIVALENT BOOSTER 12+YRS: CPT | Performed by: FAMILY MEDICINE

## 2022-11-10 PROCEDURE — 90686 IIV4 VACC NO PRSV 0.5 ML IM: CPT | Performed by: FAMILY MEDICINE

## 2022-11-10 PROCEDURE — 90471 IMMUNIZATION ADMIN: CPT | Performed by: FAMILY MEDICINE

## 2022-11-10 PROCEDURE — 91312 COVID-19 (PFIZER) BIVALENT BOOSTER 12+YRS: CPT | Performed by: FAMILY MEDICINE

## 2022-11-10 PROCEDURE — 99396 PREV VISIT EST AGE 40-64: CPT | Performed by: FAMILY MEDICINE

## 2022-11-10 RX ORDER — ESTRADIOL 0.1 MG/G
CREAM VAGINAL
COMMUNITY
Start: 2022-10-27

## 2022-11-10 NOTE — PROGRESS NOTES
"Chief Complaint  Annual Exam    Subjective        Stacy Salomon presents to [facility]  History of Present Illness     Here for healthcare maintenance visit.  She is interested in weight loss medication.  She had trouble losing weight in the past.  Her BMI is 39.9.  There is no evidence of diabetes.  She has some mild dyslipidemia.  But overall her HDL is high.  She continues on hydrochlorothiazide and metoprolol for hypertension which is overall well controlled.  She has no other complaints about her health today other than a ganglion cyst on the left palm.  States it bothers her sometimes.  I have referred her to a hand doctor to see her.    Social History     Tobacco Use   • Smoking status: Never     Passive exposure: Yes   • Smokeless tobacco: Never   Vaping Use   • Vaping Use: Never used   Substance Use Topics   • Alcohol use: Yes     Comment: 2 glasses of wine/ month.   2 beers / month.  1 cocktail/mon   • Drug use: No         Objective   Vital Signs:  /67   Pulse 68   Temp 97.5 °F (36.4 °C) (Temporal)   Ht 160 cm (63\")   Wt 102 kg (225 lb 6.4 oz)   SpO2 97%   BMI 39.93 kg/m²   Estimated body mass index is 39.93 kg/m² as calculated from the following:    Height as of this encounter: 160 cm (63\").    Weight as of this encounter: 102 kg (225 lb 6.4 oz).           Physical Exam  Constitutional:       Appearance: Normal appearance. She is obese.   HENT:      Head: Atraumatic.      Mouth/Throat:      Pharynx: Oropharynx is clear. No oropharyngeal exudate or posterior oropharyngeal erythema.   Eyes:      Conjunctiva/sclera: Conjunctivae normal.   Cardiovascular:      Rate and Rhythm: Normal rate and regular rhythm.      Pulses: Normal pulses.      Heart sounds: Normal heart sounds.   Pulmonary:      Effort: Pulmonary effort is normal.      Breath sounds: Normal breath sounds.   Abdominal:      General: Abdomen is flat. There is no distension.      Palpations: Abdomen is " soft. There is no mass.      Tenderness: There is no abdominal tenderness.      Hernia: No hernia is present.   Musculoskeletal:         General: Normal range of motion.      Cervical back: Normal range of motion and neck supple. No muscular tenderness.   Lymphadenopathy:      Cervical: No cervical adenopathy.   Skin:     General: Skin is warm and dry.      Findings: No rash.   Neurological:      General: No focal deficit present.      Mental Status: She is alert and oriented to person, place, and time.   Psychiatric:         Mood and Affect: Mood normal.        Result Review :  The following data was reviewed by: Parrish Hawkins MD on 11/10/2022:  Common labs    Common Labs 3/30/22 5/6/22 10/24/22 10/24/22 10/24/22      0918 0918 0918   Glucose 95   96    BUN 16   15    Creatinine 0.64 0.60  0.62    Sodium 140   141    Potassium 4.1   4.2    Chloride 101   102    Calcium 9.5   9.4    Total Protein 7.1   7.0    Albumin 4.5   4.40    Total Bilirubin 0.4   0.5    Alkaline Phosphatase 85   95    AST (SGOT) 15   16    ALT (SGPT) 20   20    WBC   8.80     Hemoglobin   13.3     Hematocrit   40.0     Platelets   350     Total Cholesterol     180   Triglycerides     68   HDL Cholesterol     65 (A)   LDL Cholesterol      102 (A)   (A) Abnormal value       Comments are available for some flowsheets but are not being displayed.                     Assessment and Plan   Diagnoses and all orders for this visit:    1. Health care maintenance (Primary)  -     CBC & Differential; Future  -     Comprehensive Metabolic Panel; Future  -     Lipid Panel; Future    2. Essential hypertension  -     CBC & Differential; Future  -     Comprehensive Metabolic Panel; Future  -     Lipid Panel; Future    3. Class 2 severe obesity due to excess calories with serious comorbidity and body mass index (BMI) of 39.0 to 39.9 in adult (HCC)    Other orders  -     Semaglutide-Weight Management 0.25 MG/0.5ML solution auto-injector; Inject 0.25 mg under  the skin into the appropriate area as directed Every 7 (Seven) Days.  Dispense: 2 mL; Refill: 0  -     COVID-19 Bivalent Booster (Pfizer) 12+yrs  -     FluLaval/Fluarix/Fluzone >6 Months      Annual healthcare maintenance visit.    Immunizations.  Flu vaccine and COVID booster today.    Colon cancer screening up-to-date.    Mammogram up-to-date.    Follows with her gynecologist.    Class II severe obesity BMI 39.  She is a good candidate for medication assisted weight loss.  I am recommending a GLP-1 agonist, Wegovy.  Discussed benefits risks and side effects.  Potential risks include theoretical experimental lab animal risk of medullary thyroid carcinoma.  No family history of personal history of thyroid cancer or multiple endocrine neoplasia's.  Benefits this medication very likely outweigh any risks.  Discussed how the medication is use and how to ramp up the dose.  If she is able to afford it, and able to get it because it is on backorder, I want to see her back in 1 month of starting it for recheck either office visit or a MyChart video visit.    Hypertension.  Stable.    Other preventative health practices discussed.  See me either in 1 month after starting the Wegovy or 6 months from now.         Follow Up   No follow-ups on file.  Patient was given instructions and counseling regarding her condition or for health maintenance advice. Please see specific information pulled into the AVS if appropriate.

## 2023-01-06 RX ORDER — PEN NEEDLE, DIABETIC 30 GX3/16"
1 NEEDLE, DISPOSABLE MISCELLANEOUS DAILY
Qty: 100 EACH | Refills: 2 | Status: SHIPPED | OUTPATIENT
Start: 2023-01-06

## 2023-01-31 RX ORDER — HYDROCHLOROTHIAZIDE 25 MG/1
25 TABLET ORAL DAILY
Qty: 90 TABLET | Refills: 1 | Status: SHIPPED | OUTPATIENT
Start: 2023-01-31 | End: 2023-02-17

## 2023-01-31 NOTE — TELEPHONE ENCOUNTER
Rx Refill Note  Requested Prescriptions     Pending Prescriptions Disp Refills   • hydroCHLOROthiazide (HYDRODIURIL) 25 MG tablet [Pharmacy Med Name: HYDROCHLOROTHIAZIDE 25MG TABLETS] 90 tablet 1     Sig: TAKE 1 TABLET BY MOUTH DAILY      Last office visit with prescribing clinician: 11/10/2022   Last telemedicine visit with prescribing clinician: Visit date not found   Next office visit with prescribing clinician: Visit date not found                         Would you like a call back once the refill request has been completed: [] Yes [] No    If the office needs to give you a call back, can they leave a voicemail: [] Yes [] No    Miky Churchill  01/31/23, 07:56 EST

## 2023-02-08 RX ORDER — SEMAGLUTIDE 0.5 MG/.5ML
0.5 INJECTION, SOLUTION SUBCUTANEOUS WEEKLY
Qty: 2 ML | Refills: 0 | Status: SHIPPED | OUTPATIENT
Start: 2023-02-08 | End: 2023-02-17

## 2023-02-17 ENCOUNTER — OFFICE VISIT (OUTPATIENT)
Dept: FAMILY MEDICINE CLINIC | Facility: CLINIC | Age: 58
End: 2023-02-17
Payer: COMMERCIAL

## 2023-02-17 VITALS
HEART RATE: 63 BPM | HEIGHT: 63 IN | OXYGEN SATURATION: 98 % | DIASTOLIC BLOOD PRESSURE: 75 MMHG | TEMPERATURE: 97.5 F | SYSTOLIC BLOOD PRESSURE: 120 MMHG | BODY MASS INDEX: 40.04 KG/M2 | WEIGHT: 226 LBS

## 2023-02-17 DIAGNOSIS — I10 ESSENTIAL HYPERTENSION: Primary | ICD-10-CM

## 2023-02-17 DIAGNOSIS — E66.01 CLASS 2 SEVERE OBESITY DUE TO EXCESS CALORIES WITH SERIOUS COMORBIDITY AND BODY MASS INDEX (BMI) OF 39.0 TO 39.9 IN ADULT: ICD-10-CM

## 2023-02-17 PROCEDURE — 99213 OFFICE O/P EST LOW 20 MIN: CPT | Performed by: FAMILY MEDICINE

## 2023-02-17 RX ORDER — SEMAGLUTIDE 0.5 MG/.5ML
0.5 INJECTION, SOLUTION SUBCUTANEOUS WEEKLY
Qty: 2 ML | Refills: 0 | Status: SHIPPED | OUTPATIENT
Start: 2023-02-17 | End: 2023-03-17

## 2023-02-17 RX ORDER — FAMOTIDINE 20 MG/1
20 TABLET, FILM COATED ORAL NIGHTLY PRN
Qty: 100 TABLET | Refills: 0 | Status: SHIPPED | OUTPATIENT
Start: 2023-02-17

## 2023-02-17 NOTE — PROGRESS NOTES
"Chief Complaint  Weight Check    Subjective        Stacy Salomon presents to [facility]  History of Present Illness    Follow-up class II/class III obesity.  B-CoVita.  Normal creatinine.  No diabetes.  She started it 1 month ago.  She is 0.25 mg.  No nausea.  May be some heartburn.  She definitely feels different with the medicine.  Appetite is less.  Feels better.  Has lost under 10 pounds.  Her insurance does not pay for.  She has pain for out-of-pocket.  She is working on planning diet and exercise now 2.  She states she ran out of her hydrochlorothiazide and just felt better.  Her blood pressure has been stable.  She did not restart it.    Objective   Vital Signs:  /75   Pulse 63   Temp 97.5 °F (36.4 °C) (Temporal)   Ht 160 cm (63\")   Wt 103 kg (226 lb)   SpO2 98%   BMI 40.03 kg/m²   Estimated body mass index is 40.03 kg/m² as calculated from the following:    Height as of this encounter: 160 cm (63\").    Weight as of this encounter: 103 kg (226 lb).              Physical Exam  Vitals and nursing note reviewed.   Constitutional:       General: She is not in acute distress.     Appearance: She is well-developed.   Cardiovascular:      Rate and Rhythm: Normal rate and regular rhythm.      Heart sounds: Normal heart sounds.   Pulmonary:      Effort: Pulmonary effort is normal.      Breath sounds: Normal breath sounds.   Musculoskeletal:      Cervical back: Normal range of motion.   Skin:     General: Skin is warm and dry.   Psychiatric:         Mood and Affect: Mood normal.        Result Review :  The following data was reviewed by: Parrish Hawkins MD on 02/17/2023:  Common labs    Common Labs 3/30/22 5/6/22 10/24/22 10/24/22 10/24/22      0918 0918 0918   Glucose 95   96    BUN 16   15    Creatinine 0.64 0.60  0.62    Sodium 140   141    Potassium 4.1   4.2    Chloride 101   102    Calcium 9.5   9.4    Total Protein 7.1   7.0    Albumin 4.5   4.40    Total Bilirubin 0.4 "   0.5    Alkaline Phosphatase 85   95    AST (SGOT) 15   16    ALT (SGPT) 20   20    WBC   8.80     Hemoglobin   13.3     Hematocrit   40.0     Platelets   350     Total Cholesterol     180   Triglycerides     68   HDL Cholesterol     65 (A)   LDL Cholesterol      102 (A)   (A) Abnormal value       Comments are available for some flowsheets but are not being displayed.                        Assessment and Plan   Diagnoses and all orders for this visit:    1. Essential hypertension (Primary)  -     Hemoglobin A1c; Future  -     Comprehensive Metabolic Panel; Future  -     Lipid Panel; Future    2. Class 2 severe obesity due to excess calories with serious comorbidity and body mass index (BMI) of 39.0 to 39.9 in adult (HCC)  -     Hemoglobin A1c; Future  -     Comprehensive Metabolic Panel; Future  -     Lipid Panel; Future    Other orders  -     Semaglutide-Weight Management (Wegovy) 0.5 MG/0.5ML solution auto-injector; Inject 0.5 mL under the skin into the appropriate area as directed 1 (One) Time Per Week.  Dispense: 2 mL; Refill: 0  -     famotidine (Pepcid) 20 MG tablet; Take 1 tablet by mouth At Night As Needed for Heartburn.  Dispense: 100 tablet; Refill: 0        Class III obesity.  BMI 40.  He is losing weight.  Continue week OV.  Increase to 0.5 mg.  In 1 month increase to 1 mg.  Side effects discussed.  Over-the-counter famotidine as needed for heartburn associated with this medication.  She feels better not taking hydrochlorothiazide and her blood pressure is normal.  We will keep her off hydrochlorothiazide for now.  She will contact us with questions.       Follow Up   No follow-ups on file.  Patient was given instructions and counseling regarding her condition or for health maintenance advice. Please see specific information pulled into the AVS if appropriate.       Answers for HPI/ROS submitted by the patient on 2/17/2023  Please describe your symptoms.: Follow up visit  Have you had these symptoms  before?: No  How long have you been having these symptoms?: Greater than 2 weeks  What is the primary reason for your visit?: Other

## 2023-03-17 RX ORDER — SEMAGLUTIDE 1 MG/.5ML
1 INJECTION, SOLUTION SUBCUTANEOUS WEEKLY
Qty: 2 ML | Refills: 2 | Status: SHIPPED | OUTPATIENT
Start: 2023-03-17

## 2023-03-17 RX ORDER — SEMAGLUTIDE 1 MG/.5ML
1 INJECTION, SOLUTION SUBCUTANEOUS WEEKLY
Qty: 2 ML | Refills: 2 | Status: SHIPPED | OUTPATIENT
Start: 2023-03-17 | End: 2023-03-17

## 2023-03-17 NOTE — TELEPHONE ENCOUNTER
----- Message from Stacy Salomon sent at 3/17/2023  7:56 AM EDT -----  Regarding: Wegovy next level refill  Contact: [phone]  Dr. Hakwins, I just gave my last injection of the 0.5.  I’m ready for the next level refill.  I didn’t use the refill request, because it was the 0.5.     Please send refill to [facility].

## 2023-03-17 NOTE — TELEPHONE ENCOUNTER
Rx Refill Note  Requested Prescriptions     Pending Prescriptions Disp Refills   • Semaglutide-Weight Management (Wegovy) 1 MG/0.5ML solution auto-injector 0.5 mL      Sig: Inject 1 mg under the skin into the appropriate area as directed 1 (One) Time Per Week.      Last office visit with prescribing clinician: 2/17/2023   Last telemedicine visit with prescribing clinician: 5/26/2023   Next office visit with prescribing clinician: 5/26/2023                         Would you like a call back once the refill request has been completed: [] Yes [] No    If the office needs to give you a call back, can they leave a voicemail: [] Yes [] No    Miky Churchill  03/17/23, 09:15 EDT

## 2023-03-27 ENCOUNTER — TELEPHONE (OUTPATIENT)
Dept: FAMILY MEDICINE CLINIC | Facility: CLINIC | Age: 58
End: 2023-03-27
Payer: COMMERCIAL

## 2023-03-27 NOTE — TELEPHONE ENCOUNTER
PT CALLED AND WANTED AN UPDATE ON THE WEGOVY MESSAGE SHE SENT BACK EARLIER THIS MONTH.     PLEASE CALL PT BACK AND INFORM. THANK YOU.

## 2023-03-29 RX ORDER — METOPROLOL SUCCINATE 25 MG/1
25 TABLET, EXTENDED RELEASE ORAL DAILY
Qty: 90 TABLET | Refills: 1 | Status: SHIPPED | OUTPATIENT
Start: 2023-03-29

## 2023-03-29 NOTE — TELEPHONE ENCOUNTER
Rx Refill Note  Requested Prescriptions     Pending Prescriptions Disp Refills   • metoprolol succinate XL (TOPROL-XL) 25 MG 24 hr tablet [Pharmacy Med Name: METOPROLOL ER SUCCINATE 25MG TABS] 90 tablet 1     Sig: TAKE 1 TABLET BY MOUTH DAILY      Last office visit with prescribing clinician: 2/17/2023   Last telemedicine visit with prescribing clinician: 5/26/2023   Next office visit with prescribing clinician: 5/26/2023                         Would you like a call back once the refill request has been completed: [] Yes [] No    If the office needs to give you a call back, can they leave a voicemail: [] Yes [] No    Miky Churchill  03/29/23, 11:01 EDT

## 2023-05-11 ENCOUNTER — OFFICE VISIT (OUTPATIENT)
Dept: FAMILY MEDICINE CLINIC | Facility: CLINIC | Age: 58
End: 2023-05-11
Payer: COMMERCIAL

## 2023-05-11 VITALS
TEMPERATURE: 96.6 F | DIASTOLIC BLOOD PRESSURE: 80 MMHG | RESPIRATION RATE: 14 BRPM | WEIGHT: 229 LBS | BODY MASS INDEX: 40.57 KG/M2 | HEART RATE: 80 BPM | HEIGHT: 63 IN | OXYGEN SATURATION: 96 % | SYSTOLIC BLOOD PRESSURE: 136 MMHG

## 2023-05-11 DIAGNOSIS — R05.1 ACUTE COUGH: ICD-10-CM

## 2023-05-11 DIAGNOSIS — I10 ESSENTIAL HYPERTENSION: ICD-10-CM

## 2023-05-11 DIAGNOSIS — E66.01 CLASS 2 SEVERE OBESITY DUE TO EXCESS CALORIES WITH SERIOUS COMORBIDITY AND BODY MASS INDEX (BMI) OF 39.0 TO 39.9 IN ADULT: ICD-10-CM

## 2023-05-11 DIAGNOSIS — B34.9 ACUTE VIRAL SYNDROME: Primary | ICD-10-CM

## 2023-05-11 PROCEDURE — 99214 OFFICE O/P EST MOD 30 MIN: CPT | Performed by: NURSE PRACTITIONER

## 2023-05-11 RX ORDER — DEXTROMETHORPHAN HYDROBROMIDE AND PROMETHAZINE HYDROCHLORIDE 15; 6.25 MG/5ML; MG/5ML
5 SYRUP ORAL 3 TIMES DAILY PRN
Qty: 240 ML | Refills: 0 | Status: SHIPPED | OUTPATIENT
Start: 2023-05-11

## 2023-05-11 NOTE — PATIENT INSTRUCTIONS
Discharge instructions    Push fluids plenty of rest, if needed Promethazine DM as needed for cough mostly bedtime, for nasal drainage congestion saline nasal spray as needed,  Menthol as needed,  If miserable cannot breathe at night only at nighttime with caution  Afrin nasal spray decongestant 1 spray each nare repeat in 5 minutes at bedtime only,  Decrease to 1 spray each nare next day  Down to once the third day then DC and only if needed avoid if not    It could raise blood pressure but unlikely unless overuse or combine with other Sudafed decongestions do not do this.  If high fever chest pain shortness of breath emergency room update me 1 week of condition please 1 will follow you into your well

## 2023-05-11 NOTE — PROGRESS NOTES
"Chief Complaint  Sudden onset cough with chills  Subjective        Stacy Salomon presents to [facility]  History of Present Illness  Patient complaints of sudden onset cough last evening and woke up this am with a coughing attack. + chills, + sweating, did not check temperature. Small amounts of thick, yellow/green sputum. Denies shortness of breath, does state breathing has been a \"little different\" but cannot really describe. Denies a history of asthma, or recent respiratory illness. Has not traveled or been around sick people.  As above, presently no increased chest pain shortness of breath  Coughing getting worse.    Generally healthy no history of chronic lung disease  Has not had pneumonia before but unfortunately her   several years ago with pneumonia.    She has been vaccinated for COVID, COVID test today flu test today negative.        Objective   Vital Signs:  /80   Pulse 80   Temp 96.6 °F (35.9 °C) (Infrared)   Resp 14   Ht 160 cm (63\")   Wt 104 kg (229 lb)   SpO2 96%   BMI 40.57 kg/m²   Estimated body mass index is 40.57 kg/m² as calculated from the following:    Height as of this encounter: 160 cm (63\").    Weight as of this encounter: 104 kg (229 lb).             Physical Exam  Vitals reviewed.   Constitutional:       General: She is not in acute distress.     Appearance: She is well-developed. She is obese. She is not ill-appearing or diaphoretic.      Comments: Pleasant appears well   HENT:      Head: Normocephalic.      Nose: Nose normal.   Eyes:      General: No scleral icterus.     Conjunctiva/sclera: Conjunctivae normal.      Pupils: Pupils are equal, round, and reactive to light.   Neck:      Thyroid: No thyromegaly.      Vascular: No JVD.   Cardiovascular:      Rate and Rhythm: Normal rate and regular rhythm.      Heart sounds: Normal heart sounds. No murmur heard.    No friction rub. No gallop.   Pulmonary:      Effort: Pulmonary effort is " normal. No respiratory distress.      Breath sounds: Normal breath sounds. No stridor. No wheezing or rales.      Comments: Clear unlabored  Abdominal:      Palpations: Abdomen is soft.      Comments: No hepatosplenomegaly, no ascites,   Musculoskeletal:         General: No tenderness.      Cervical back: Neck supple.   Lymphadenopathy:      Cervical: No cervical adenopathy.   Skin:     General: Skin is warm and dry.      Findings: No erythema or rash.   Neurological:      General: No focal deficit present.      Mental Status: She is alert and oriented to person, place, and time. Mental status is at baseline.      Deep Tendon Reflexes: Reflexes are normal and symmetric.   Psychiatric:         Mood and Affect: Mood normal.         Behavior: Behavior normal.         Thought Content: Thought content normal.         Judgment: Judgment normal.        Result Review :        Student ADRI Lucas initially had seen patient and reviewed HPI and physical exam however completed my own HPI and agree with her assessment     Physical exam completed by myself as well as HPI and social history assessment and plan completed per myself  In its entirety     Assessment and Plan   Diagnoses and all orders for this visit:    1. Acute viral syndrome (Primary)    2. Acute cough    Other orders  -     promethazine-dextromethorphan (PROMETHAZINE-DM) 6.25-15 MG/5ML syrup; Take 5 mL by mouth 3 (Three) Times a Day As Needed for Cough.  Dispense: 240 mL; Refill: 0             Follow Up   No follow-ups on file.  Patient was given instructions and counseling regarding her condition or for health maintenance advice. Please see specific information pulled into the AVS if appropriate.     Discharge instructions    Push fluids plenty of rest, if needed Promethazine DM as needed for cough mostly bedtime, for nasal drainage congestion saline nasal spray as needed,  Menthol as needed,  If miserable cannot breathe at night only at nighttime with  caution  Afrin nasal spray decongestant 1 spray each nare repeat in 5 minutes at bedtime only,  Decrease to 1 spray each nare next day  Down to once the third day then DC and only if needed avoid if not    It could raise blood pressure but unlikely unless overuse or combine with other Sudafed decongestions do not do this.  If high fever chest pain shortness of breath emergency room update me 1 week of condition please 1 will follow you into your well

## 2023-05-26 ENCOUNTER — OFFICE VISIT (OUTPATIENT)
Dept: FAMILY MEDICINE CLINIC | Facility: CLINIC | Age: 58
End: 2023-05-26
Payer: COMMERCIAL

## 2023-05-26 VITALS
DIASTOLIC BLOOD PRESSURE: 83 MMHG | OXYGEN SATURATION: 97 % | SYSTOLIC BLOOD PRESSURE: 143 MMHG | TEMPERATURE: 97.5 F | BODY MASS INDEX: 40.63 KG/M2 | HEIGHT: 63 IN | WEIGHT: 229.3 LBS | HEART RATE: 63 BPM

## 2023-05-26 DIAGNOSIS — E66.01 CLASS 2 SEVERE OBESITY DUE TO EXCESS CALORIES WITH SERIOUS COMORBIDITY AND BODY MASS INDEX (BMI) OF 39.0 TO 39.9 IN ADULT: ICD-10-CM

## 2023-05-26 DIAGNOSIS — I10 ESSENTIAL HYPERTENSION: Primary | ICD-10-CM

## 2023-05-26 RX ORDER — HYDROCHLOROTHIAZIDE 25 MG/1
25 TABLET ORAL DAILY
Qty: 90 TABLET | Refills: 1 | Status: SHIPPED | OUTPATIENT
Start: 2023-05-26

## 2023-05-26 NOTE — PROGRESS NOTES
"Chief Complaint  Hypertension    Subjective        Stacy Salomon presents to [facility]  History of Present Illness    Hypertension follow-up.  Blood pressures been running on the average in the mid 130s dilated.  She had stopped hydrochlorothiazide in the past at my direction.  No cardiovascular symptoms.  She cannot afford the semaglutide.  She only lost about 10 pounds on it.  She has had some very mild depression which is not interested in medication at this time.  With bupropion she had a hypertensive/paradoxical anxiety reaction.   Objective   Vital Signs:  /83   Pulse 63   Temp 97.5 °F (36.4 °C) (Temporal)   Ht 160 cm (63\")   Wt 104 kg (229 lb 4.8 oz)   SpO2 97%   BMI 40.62 kg/m²   Estimated body mass index is 40.62 kg/m² as calculated from the following:    Height as of this encounter: 160 cm (63\").    Weight as of this encounter: 104 kg (229 lb 4.8 oz).             Physical Exam  Vitals and nursing note reviewed.   Constitutional:       General: She is not in acute distress.     Appearance: She is well-developed.   Cardiovascular:      Rate and Rhythm: Normal rate and regular rhythm.      Heart sounds: Normal heart sounds.   Pulmonary:      Effort: Pulmonary effort is normal.      Breath sounds: Normal breath sounds.   Skin:     General: Skin is warm and dry.   Psychiatric:         Mood and Affect: Mood normal.        Result Review :  The following data was reviewed by: Parrish Hawkins MD on 05/26/2023:  Common labs        10/24/2022    09:18   Common Labs   Glucose 96     BUN 15     Creatinine 0.62     Sodium 141     Potassium 4.2     Chloride 102     Calcium 9.4     Total Protein 7.0     Albumin 4.40     Total Bilirubin 0.5     Alkaline Phosphatase 95     AST (SGOT) 16     ALT (SGPT) 20     WBC 8.80     Hemoglobin 13.3     Hematocrit 40.0     Platelets 350     Total Cholesterol 180     Triglycerides 68     HDL Cholesterol 65     LDL Cholesterol  102              "       Assessment and Plan   Diagnoses and all orders for this visit:    1. Essential hypertension (Primary)  -     CBC & Differential; Future  -     Comprehensive Metabolic Panel; Future  -     Lipid Panel; Future  -     Hemoglobin A1c; Future    2. Class 2 severe obesity due to excess calories with serious comorbidity and body mass index (BMI) of 39.0 to 39.9 in adult  -     CBC & Differential; Future  -     Comprehensive Metabolic Panel; Future  -     Lipid Panel; Future  -     Hemoglobin A1c; Future    Other orders  -     hydroCHLOROthiazide (HYDRODIURIL) 25 MG tablet; Take 1 tablet by mouth Daily.  Dispense: 90 tablet; Refill: 1       At this time I recommend she restart her hydrochlorothiazide 25 mg a day.  Continue metoprolol.  I will see her back in 6 months for annual complete physical examination.         Follow Up   No follow-ups on file.  Patient was given instructions and counseling regarding her condition or for health maintenance advice. Please see specific information pulled into the AVS if appropriate.

## 2023-07-26 ENCOUNTER — OFFICE VISIT (OUTPATIENT)
Dept: SLEEP MEDICINE | Facility: HOSPITAL | Age: 58
End: 2023-07-26
Payer: COMMERCIAL

## 2023-07-26 VITALS — HEART RATE: 87 BPM | BODY MASS INDEX: 39.78 KG/M2 | HEIGHT: 64 IN | WEIGHT: 233 LBS | OXYGEN SATURATION: 98 %

## 2023-07-26 DIAGNOSIS — E66.01 CLASS 3 SEVERE OBESITY DUE TO EXCESS CALORIES WITH SERIOUS COMORBIDITY AND BODY MASS INDEX (BMI) OF 40.0 TO 44.9 IN ADULT: ICD-10-CM

## 2023-07-26 DIAGNOSIS — G47.36 HYPOXEMIA ASSOCIATED WITH SLEEP: ICD-10-CM

## 2023-07-26 DIAGNOSIS — G47.33 OSA (OBSTRUCTIVE SLEEP APNEA): Primary | ICD-10-CM

## 2023-07-26 DIAGNOSIS — G47.14 HYPERSOMNIA DUE TO MEDICAL CONDITION: ICD-10-CM

## 2023-07-26 PROCEDURE — G0463 HOSPITAL OUTPT CLINIC VISIT: HCPCS

## 2023-08-05 PROBLEM — E66.813 CLASS 3 SEVERE OBESITY DUE TO EXCESS CALORIES WITH SERIOUS COMORBIDITY AND BODY MASS INDEX (BMI) OF 40.0 TO 44.9 IN ADULT: Status: ACTIVE | Noted: 2022-07-02

## 2023-09-06 ENCOUNTER — HOSPITAL ENCOUNTER (OUTPATIENT)
Facility: HOSPITAL | Age: 58
Discharge: HOME | End: 2023-09-06
Admitting: OBSTETRICS & GYNECOLOGY
Payer: COMMERCIAL

## 2023-09-06 DIAGNOSIS — Z15.89: ICD-10-CM

## 2023-09-06 DIAGNOSIS — Z80.3 FAMILY HISTORY OF BREAST CANCER: ICD-10-CM

## 2023-09-06 DIAGNOSIS — Z14.8 CARRIER OF GENETIC DISORDER: ICD-10-CM

## 2023-09-06 PROCEDURE — A9577 INJ MULTIHANCE: HCPCS | Performed by: OBSTETRICS & GYNECOLOGY

## 2023-09-06 PROCEDURE — 77049 MRI BREAST C-+ W/CAD BI: CPT

## 2023-09-06 PROCEDURE — 0 GADOBENATE DIMEGLUMINE 529 MG/ML SOLUTION: Performed by: OBSTETRICS & GYNECOLOGY

## 2023-09-06 RX ADMIN — GADOBENATE DIMEGLUMINE 20 ML: 529 INJECTION, SOLUTION INTRAVENOUS at 10:04

## 2023-09-22 LAB — CREAT BLDA-MCNC: 0.6 MG/DL (ref 0.6–1.3)

## 2023-09-27 RX ORDER — METOPROLOL SUCCINATE 25 MG/1
25 TABLET, EXTENDED RELEASE ORAL DAILY
Qty: 90 TABLET | Refills: 1 | Status: SHIPPED | OUTPATIENT
Start: 2023-09-27

## 2023-09-27 NOTE — TELEPHONE ENCOUNTER
Rx Refill Note  Requested Prescriptions     Pending Prescriptions Disp Refills    metoprolol succinate XL (TOPROL-XL) 25 MG 24 hr tablet 90 tablet 1     Sig: Take 1 tablet by mouth Daily.      Last office visit with prescribing clinician: 5/26/2023   Last telemedicine visit with prescribing clinician: Visit date not found   Next office visit with prescribing clinician: Visit date not found                         Would you like a call back once the refill request has been completed: [] Yes [] No    If the office needs to give you a call back, can they leave a voicemail: [] Yes [] No    Miky Churchill  09/27/23, 15:24 EDT

## 2024-01-03 ENCOUNTER — OFFICE VISIT (OUTPATIENT)
Dept: SPORTS MEDICINE | Facility: CLINIC | Age: 59
End: 2024-01-03
Payer: COMMERCIAL

## 2024-01-03 ENCOUNTER — OFFICE VISIT (OUTPATIENT)
Dept: FAMILY MEDICINE CLINIC | Facility: CLINIC | Age: 59
End: 2024-01-03
Payer: COMMERCIAL

## 2024-01-03 VITALS
DIASTOLIC BLOOD PRESSURE: 77 MMHG | TEMPERATURE: 97.8 F | HEIGHT: 64 IN | BODY MASS INDEX: 37.28 KG/M2 | SYSTOLIC BLOOD PRESSURE: 123 MMHG | HEART RATE: 75 BPM | OXYGEN SATURATION: 95 % | WEIGHT: 218.4 LBS

## 2024-01-03 VITALS
WEIGHT: 218.48 LBS | HEART RATE: 78 BPM | TEMPERATURE: 97.8 F | RESPIRATION RATE: 14 BRPM | BODY MASS INDEX: 37.3 KG/M2 | DIASTOLIC BLOOD PRESSURE: 80 MMHG | SYSTOLIC BLOOD PRESSURE: 110 MMHG | OXYGEN SATURATION: 98 % | HEIGHT: 64 IN

## 2024-01-03 DIAGNOSIS — M75.51 SUBDELTOID BURSITIS OF RIGHT SHOULDER JOINT: Primary | ICD-10-CM

## 2024-01-03 DIAGNOSIS — Z00.00 HEALTH CARE MAINTENANCE: Primary | ICD-10-CM

## 2024-01-03 DIAGNOSIS — M25.511 ACUTE PAIN OF RIGHT SHOULDER: ICD-10-CM

## 2024-01-03 PROCEDURE — 99396 PREV VISIT EST AGE 40-64: CPT | Performed by: FAMILY MEDICINE

## 2024-01-03 RX ORDER — TRIAMCINOLONE ACETONIDE 40 MG/ML
40 INJECTION, SUSPENSION INTRA-ARTICULAR; INTRAMUSCULAR ONCE
Status: COMPLETED | OUTPATIENT
Start: 2024-01-03 | End: 2024-01-03

## 2024-01-03 RX ADMIN — TRIAMCINOLONE ACETONIDE 40 MG: 40 INJECTION, SUSPENSION INTRA-ARTICULAR; INTRAMUSCULAR at 10:38

## 2024-01-03 NOTE — PROGRESS NOTES
"Chief Complaint  Annual Exam    Subjective        Stacy Salomon presents to [facility]  History of Present Illness    Annual healthcare maintenance visit.  Non-smoker.  She is trying to get some exercise.  She has been losing weight through the semaglutide retail pharmacy compounding clinic.  She states her insurance does not pay for the FDA approved brand semaglutide.  She states she is lost the better part of 10 to 15 pounds.  Lab work is pending.  She is having some shoulder pain for the last 4 days.  She has had it before.  She went to the urgent care this morning.  They gave her some meloxicam.  X-ray reportedly normal.    Objective   Vital Signs:  /77   Pulse 75   Temp 97.8 °F (36.6 °C) (Temporal)   Ht 161.3 cm (63.5\")   Wt 99.1 kg (218 lb 6.4 oz)   SpO2 95%   BMI 38.08 kg/m²   Estimated body mass index is 38.08 kg/m² as calculated from the following:    Height as of this encounter: 161.3 cm (63.5\").    Weight as of this encounter: 99.1 kg (218 lb 6.4 oz).               Physical Exam  Constitutional:       Appearance: Normal appearance. She is obese.   HENT:      Head: Atraumatic.      Mouth/Throat:      Pharynx: Oropharynx is clear. No oropharyngeal exudate or posterior oropharyngeal erythema.   Eyes:      Conjunctiva/sclera: Conjunctivae normal.   Neck:      Comments: Thyroid examination unremarkable  Cardiovascular:      Rate and Rhythm: Normal rate and regular rhythm.      Pulses: Normal pulses.      Heart sounds: Normal heart sounds.   Pulmonary:      Effort: Pulmonary effort is normal.      Breath sounds: Normal breath sounds.   Abdominal:      General: Abdomen is flat. There is no distension.      Palpations: Abdomen is soft. There is no mass.      Tenderness: There is no abdominal tenderness.      Hernia: No hernia is present.   Musculoskeletal:      Cervical back: Normal range of motion and neck supple. No muscular tenderness.   Lymphadenopathy:      " Cervical: No cervical adenopathy.   Skin:     General: Skin is warm and dry.      Findings: No rash.   Neurological:      General: No focal deficit present.      Mental Status: She is alert and oriented to person, place, and time.   Psychiatric:         Mood and Affect: Mood normal.        Result Review :                   Assessment and Plan   Diagnoses and all orders for this visit:    1. Health care maintenance (Primary)  -     Hemoglobin A1c  -     Comprehensive Metabolic Panel  -     Lipid Panel  -     Hemoglobin A1c  -     Hemoglobin A1c; Future  -     Comprehensive Metabolic Panel; Future  -     Lipid Panel; Future    2. Acute pain of right shoulder  -     Ambulatory Referral to Sports Medicine      Annual healthcare maintenance visit.    Immunizations.  Up-to-date COVID booster recommended.  Flu shot recommended.  At retail pharmacy.  We do not have in stock.    Breast cancer screening up-to-date through her gynecologist.  She gets breast MRIs yearly because of reported dense breast and a family history of breast cancer in 3 aunts.    Colon cancer screening up-to-date.    Shoulder pain.  Probable rotator cuff issue or bursitis.  Referral to [facility] sports medicine.    Obesity.  Class II.  She receives semaglutide from a compounding retail pharmacy.  Likely not FDA approved.  She asked for my opinion.  My opinion is that I do not recommend it.    Other preventative health practices discussed.  See me in October.             Follow Up   No follow-ups on file.  Patient was given instructions and counseling regarding her condition or for health maintenance advice. Please see specific information pulled into the AVS if appropriate.

## 2024-01-03 NOTE — PROGRESS NOTES
"Stacy is a 58 y.o. year old female    Chief Complaint   Patient presents with    Right Shoulder - Pain     That started about 4-5 days ago.  Throbbing last night when she went to bed last night  Bursitis-requesting cortisone injection       History of Present Illness   Pain  Pertinent negatives include no fever or numbness.      Referred to us by her PCP Parrish Borrego MD for right shoulder pain.  Patient states for the past 4 to 5 days she has had significant right superior lateral shoulder pain that is worse with abduction and rotation.  No known trauma.  States she had similar symptoms a few months ago after swimming in her parents pool but after a few days it felt better.  She feels like she may have \"slept on it wrong\".  No neck pain or paresthesias.  Went to urgent care center this morning where x-rays were done, diagnosed with bursitis, saw her PCP this morning and he agrees and asked her to see us here for possible treatments.  Pain radiates towards the deltoid insertion.    Review of Systems   Constitutional:  Negative for fever.   Musculoskeletal:         Per HPI   Skin:  Negative for wound.   Neurological:  Negative for numbness.   All other systems reviewed and are negative.      /80 (BP Location: Left arm, Patient Position: Sitting, Cuff Size: Large Adult)   Pulse 78   Temp 97.8 °F (36.6 °C) (Infrared)   Resp 14   Ht 161.3 cm (63.5\")   Wt 99.1 kg (218 lb 7.6 oz)   LMP 12/17/2010   SpO2 98%   BMI 38.09 kg/m²          Physical Exam  Vitals reviewed.   Constitutional:       Appearance: She is well-developed.   HENT:      Head: Normocephalic and atraumatic.   Eyes:      Conjunctiva/sclera: Conjunctivae normal.      Pupils: Pupils are equal, round, and reactive to light.   Cardiovascular:      Comments: No peripheral edema  Pulmonary:      Effort: Pulmonary effort is normal.   Musculoskeletal:      Comments: Right shoulder there is some swelling of the subacromial space and tenderness to " "palpation of the subacromial space.  Palpation of the subacromial space refers pain to the deltoid insertion.  Patient has a positive Neer and George and positive empty can negative drop arm.  No tenderness over the biceps tendon negative Speed and Yergason's.   Skin:     General: Skin is warm and dry.   Neurological:      Mental Status: She is alert and oriented to person, place, and time.   Psychiatric:         Behavior: Behavior normal.       XR Shoulder 2+ View Right (01/03/2024 07:33)-there is an area of calcification seen in the soft tissue tear to the humeral head possible calcific tendinitis or bursitis.      I did discuss with the patient she appears to have subacromial/subdeltoid bursitis, treatment options discussed with the patient she like to proceed with corticosteroid injection.    Shoulder Injection Procedure Note    Right shoulder subacromial bursa injection was discussed with the patient in detail, including indication, risks, benefits, and alternatives. Verbal consent was given for the procedure. Injection was performed by physician.  Injection site was identified by physical examination and cleaned with Betadine and alcohol swabs. Prior to needle insertion, ethyl chloride spray was used for surface anesthesia. Sterile technique was used.  A 25-gauge, 1.5\" needle was directed to the joint from a(n) lateral approach. Injectate was passed into the subacromial bursa without difficulty. The needle was removed and a simple bandage was applied. The procedure was tolerated well without difficulty.    Injection mixture:  1% lidocaine without epinephrine: 3 mL  40 mg/mL triamcinolone acetonide: 1 mL        Current Outpatient Medications:     albuterol sulfate  (90 Base) MCG/ACT inhaler, Inhale 2 puffs Every 4 (Four) Hours As Needed for Wheezing., Disp: 1 inhaler, Rfl: 0    calcium carb-cholecalciferol 600-800 MG-UNIT tablet, Take  by mouth., Disp: , Rfl:     Cetirizine HCl 10 MG capsule, Take  by " mouth., Disp: , Rfl:     Collagenase powder, , Disp: , Rfl:     Diclofenac Sodium (Voltaren) 1 % gel gel, Apply 2 g topically to the appropriate area as directed 4 (Four) Times a Day As Needed (pain). May use x5 days as directed, Disp: 50 g, Rfl: 0    estradiol (ESTRACE) 0.1 MG/GM vaginal cream, , Disp: , Rfl:     famotidine (Pepcid) 20 MG tablet, Take 1 tablet by mouth At Night As Needed for Heartburn., Disp: 100 tablet, Rfl: 0    hydroCHLOROthiazide (HYDRODIURIL) 25 MG tablet, Take 1 tablet by mouth Daily., Disp: 90 tablet, Rfl: 1    meloxicam (MOBIC) 7.5 MG tablet, Take 2 tablets by mouth Daily As Needed for Mild Pain., Disp: 15 tablet, Rfl: 0    metoprolol succinate XL (TOPROL-XL) 25 MG 24 hr tablet, Take 1 tablet by mouth Daily., Disp: 90 tablet, Rfl: 1    multivitamin (THERAGRAN) tablet tablet, Take  by mouth Daily., Disp: , Rfl:     Turmeric 500 MG capsule, Take  by mouth Daily., Disp: , Rfl:     vitamin E 400 UNIT capsule, Take 2 capsules by mouth., Disp: , Rfl:     Current Facility-Administered Medications:     triamcinolone acetonide (KENALOG-40) injection 40 mg, 40 mg, Intra-articular, Once, Lyle Lai MD     Diagnoses and all orders for this visit:    Subdeltoid bursitis of right shoulder joint  -     triamcinolone acetonide (KENALOG-40) injection 40 mg       Postinjection instructions given regarding ice and activity.  Also gave patient home exercises, she will work on range of motion for the first 3 to 4 days then and strengthening exercises.  Follow-up 2 to 3 weeks if no significant improvement or sooner if needed.      EMR Dragon/Transcription disclaimer:    Much of this encounter note is an electronic transcription/translation of spoken language to printed text.  The electronic translation of spoken language may permit erroneous, or at times, nonsensical words or phrases to be inadvertently transcribed.  Although I have reviewed the note for such errors some may still exist.

## 2024-01-03 NOTE — PROGRESS NOTES
"Chief Complaint  Pain of the Right Shoulder (That started about 4-5 days ago.  Throbbing last night when she went to bed last night/Bursitis-requesting cortisone injection)    Subjective    {Problem List  Visit Diagnosis   Encounters  Notes  Medications  Labs  Result Review Imaging  Media :23}    Stacy Salomon presents to [facility]  History of Present Illness    Objective   Vital Signs:  /80 (BP Location: Left arm, Patient Position: Sitting, Cuff Size: Large Adult)   Pulse 78   Temp 97.8 °F (36.6 °C) (Infrared)   Resp 14   Ht 161.3 cm (63.5\")   Wt 99.1 kg (218 lb 7.6 oz)   SpO2 98%   BMI 38.09 kg/m²   Estimated body mass index is 38.09 kg/m² as calculated from the following:    Height as of this encounter: 161.3 cm (63.5\").    Weight as of this encounter: 99.1 kg (218 lb 7.6 oz).       {Class 2 Severe Obesity (BMI >=35 and <=39.9. (Optional):86265}      Physical Exam   Result Review :{Labs  Result Review  Imaging  Med Tab  Media  Procedures :23}  {The following data was reviewed by (Optional):50324}  {Ambulatory Labs (Optional):34855}  {Data reviewed (Optional):49581:::1}             Assessment and Plan {CC Problem List  Visit Diagnosis   ROS  Review (Popup)  Health Maintenance  Quality  BestPractice  Medications  SmartSets  SnapShot Encounters  Media :23}  There are no diagnoses linked to this encounter.       {Time Spent (Optional):31449}  Follow Up {Instructions Charge Capture  Follow-up Communications :23}  No follow-ups on file.  Patient was given instructions and counseling regarding her condition or for health maintenance advice. Please see specific information pulled into the AVS if appropriate.         "

## 2024-01-04 ENCOUNTER — PATIENT ROUNDING (BHMG ONLY) (OUTPATIENT)
Dept: SPORTS MEDICINE | Facility: CLINIC | Age: 59
End: 2024-01-04
Payer: COMMERCIAL

## 2024-01-04 LAB
ALBUMIN SERPL-MCNC: 4.7 G/DL (ref 3.5–5.2)
ALBUMIN/GLOB SERPL: 1.5 G/DL
ALP SERPL-CCNC: 99 U/L (ref 39–117)
ALT SERPL-CCNC: 16 U/L (ref 1–33)
AST SERPL-CCNC: 14 U/L (ref 1–32)
BILIRUB SERPL-MCNC: 0.4 MG/DL (ref 0–1.2)
BUN SERPL-MCNC: 17 MG/DL (ref 6–20)
BUN/CREAT SERPL: 22.7 (ref 7–25)
CALCIUM SERPL-MCNC: 9.5 MG/DL (ref 8.6–10.5)
CHLORIDE SERPL-SCNC: 105 MMOL/L (ref 98–107)
CHOLEST SERPL-MCNC: 208 MG/DL (ref 0–200)
CO2 SERPL-SCNC: 26.3 MMOL/L (ref 22–29)
CREAT SERPL-MCNC: 0.75 MG/DL (ref 0.57–1)
EGFRCR SERPLBLD CKD-EPI 2021: 93.6 ML/MIN/1.73
GLOBULIN SER CALC-MCNC: 3.1 GM/DL
GLUCOSE SERPL-MCNC: 109 MG/DL (ref 65–99)
HBA1C MFR BLD: 5.8 % (ref 4.8–5.6)
HDLC SERPL-MCNC: 67 MG/DL (ref 40–60)
LDLC SERPL CALC-MCNC: 123 MG/DL (ref 0–100)
POTASSIUM SERPL-SCNC: 4.1 MMOL/L (ref 3.5–5.2)
PROT SERPL-MCNC: 7.8 G/DL (ref 6–8.5)
SODIUM SERPL-SCNC: 142 MMOL/L (ref 136–145)
TRIGL SERPL-MCNC: 103 MG/DL (ref 0–150)
VLDLC SERPL CALC-MCNC: 18 MG/DL (ref 5–40)

## 2024-01-04 NOTE — PROGRESS NOTES
January 4, 2024    A [employer] Message has been sent to the patient for PATIENT ROUNDING with [facility]

## 2024-04-22 RX ORDER — METOPROLOL SUCCINATE 25 MG/1
25 TABLET, EXTENDED RELEASE ORAL DAILY
Qty: 90 TABLET | Refills: 1 | Status: SHIPPED | OUTPATIENT
Start: 2024-04-22

## 2024-10-08 ENCOUNTER — OFFICE VISIT (OUTPATIENT)
Dept: FAMILY MEDICINE CLINIC | Facility: CLINIC | Age: 59
End: 2024-10-08
Payer: COMMERCIAL

## 2024-10-08 VITALS
OXYGEN SATURATION: 97 % | SYSTOLIC BLOOD PRESSURE: 131 MMHG | DIASTOLIC BLOOD PRESSURE: 80 MMHG | TEMPERATURE: 97.3 F | HEIGHT: 64 IN | WEIGHT: 211.7 LBS | HEART RATE: 66 BPM | BODY MASS INDEX: 36.14 KG/M2

## 2024-10-08 DIAGNOSIS — I10 ESSENTIAL HYPERTENSION: Primary | ICD-10-CM

## 2024-10-08 PROCEDURE — 99213 OFFICE O/P EST LOW 20 MIN: CPT | Performed by: FAMILY MEDICINE

## 2024-10-08 NOTE — PROGRESS NOTES
"Chief Complaint  Hypertension and Sore Throat (X 24hrs)    Subjective        Stacy Salomon presents to [facility]  History of Present Illness    Hypertension follow-up.  She is lost weight through diet and exercise and also going to a compounding pharmacy for semaglutide.  She is going to stop the semaglutide soon.  She stopped the hydrochlorothiazide after having lightheadedness.  The lightheadedness went away when she stopped.  She restarted it and the lightheadedness came back.  She is now back up hydrochlorothiazide.  He has been on low-dose metoprolol for a number of years.  Was started after some stressful situations and high blood pressure.  She does not get migraine headaches.    Sore throat.  In the base of her throat towards the thyroid area.  Some mild no cough.  No fever.  No sinus symptoms.  Sore throat started yesterday.  Some sick contacts in the family.    Objective   Vital Signs:  /80   Pulse 66   Temp 97.3 °F (36.3 °C) (Temporal)   Ht 161.3 cm (63.5\")   Wt 96 kg (211 lb 11.2 oz)   SpO2 97%   BMI 36.91 kg/m²   Estimated body mass index is 36.91 kg/m² as calculated from the following:    Height as of this encounter: 161.3 cm (63.5\").    Weight as of this encounter: 96 kg (211 lb 11.2 oz).          Physical Exam  Vitals and nursing note reviewed.   Constitutional:       General: She is not in acute distress.     Comments: No acute distress.  Nontoxic.   HENT:      Right Ear: Tympanic membrane, ear canal and external ear normal.      Left Ear: Tympanic membrane, ear canal and external ear normal.      Nose: Nose normal.      Mouth/Throat:      Mouth: Mucous membranes are moist.      Pharynx: Oropharynx is clear. No oropharyngeal exudate or posterior oropharyngeal erythema.   Eyes:      General: No scleral icterus.        Right eye: No discharge.         Left eye: No discharge.      Conjunctiva/sclera: Conjunctivae normal.   Cardiovascular:      Rate and " Rhythm: Normal rate.   Pulmonary:      Effort: Pulmonary effort is normal. No respiratory distress.      Breath sounds: Normal breath sounds. No stridor. No wheezing or rales.   Lymphadenopathy:      Cervical: No cervical adenopathy.   Skin:     Findings: No rash.        Result Review :  The following data was reviewed by: Parrish Hawkins MD on 10/08/2024:  Common labs          1/3/2024    09:44 9/25/2024    08:41 9/25/2024    08:45   Common Labs   Glucose 109  89     BUN 17  21     Creatinine 0.75  0.68     Sodium 142  140     Potassium 4.1  4.6     Chloride 105  105     Calcium 9.5  8.9     Total Protein 7.8  6.6     Albumin 4.7  4.2     Total Bilirubin 0.4  0.3     Alkaline Phosphatase 99  90     AST (SGOT) 14  14     ALT (SGPT) 16  19     Total Cholesterol 208  198     Triglycerides 103  55     HDL Cholesterol 67  70     LDL Cholesterol  123  118     Hemoglobin A1C 5.80   5.40                Assessment and Plan   Diagnoses and all orders for this visit:    1. Essential hypertension (Primary)  -     Hemoglobin A1c; Future  -     Comprehensive Metabolic Panel; Future  -     Lipid Panel; Future  -     CBC & Differential; Future      Hypertension.  At this point likely diet and exercise controlled.  She is off the hydrochlorothiazide.  Blood pressure is just minimally elevated here today.  I recommend she break the metoprolol in half and take one half daily for a week and then stop.  Monitor blood pressure closely over the next 3 to 4 weeks.  If blood pressure goes back up, restart metoprolol and/or low-dose hydrochlorothiazide.  She will contact me with numbers.    Probable viral URI syndrome.  Less than 24 hours.  With a sore throat and slight cough.  No clinical evidence of streptococcal pharyngitis on exam today.  A little early to test for COVID.  I recommend she wait and see how she does for the next few days.  With very severe symptoms such as high fever or shortness of breath, she was counseled to seek  medical attention immediately.       Follow Up   No follow-ups on file.  Patient was given instructions and counseling regarding her condition or for health maintenance advice. Please see specific information pulled into the AVS if appropriate.

## 2024-11-22 ENCOUNTER — HOSPITAL ENCOUNTER (EMERGENCY)
Facility: HOSPITAL | Age: 59
Discharge: HOME | End: 2024-11-22
Attending: EMERGENCY MEDICINE
Payer: COMMERCIAL

## 2024-11-22 ENCOUNTER — APPOINTMENT (OUTPATIENT)
Dept: GENERAL RADIOLOGY | Facility: HOSPITAL | Age: 59
End: 2024-11-22
Payer: COMMERCIAL

## 2024-11-22 VITALS
HEART RATE: 85 BPM | BODY MASS INDEX: 36.19 KG/M2 | RESPIRATION RATE: 16 BRPM | DIASTOLIC BLOOD PRESSURE: 89 MMHG | OXYGEN SATURATION: 94 % | HEIGHT: 64 IN | SYSTOLIC BLOOD PRESSURE: 171 MMHG | WEIGHT: 212 LBS | TEMPERATURE: 97.1 F

## 2024-11-22 DIAGNOSIS — R00.2 PALPITATIONS: ICD-10-CM

## 2024-11-22 DIAGNOSIS — I10 ESSENTIAL HYPERTENSION: Primary | ICD-10-CM

## 2024-11-22 LAB
ANION GAP SERPL CALCULATED.3IONS-SCNC: 12.9 MMOL/L (ref 5–15)
BASOPHILS # BLD AUTO: 0.03 10*3/MM3 (ref 0–0.2)
BASOPHILS NFR BLD AUTO: 0.3 % (ref 0–1.5)
BUN SERPL-MCNC: 17 MG/DL (ref 6–20)
BUN/CREAT SERPL: 24.3 (ref 7–25)
CALCIUM SPEC-SCNC: 9.5 MG/DL (ref 8.6–10.5)
CHLORIDE SERPL-SCNC: 102 MMOL/L (ref 98–107)
CO2 SERPL-SCNC: 25.1 MMOL/L (ref 22–29)
CREAT SERPL-MCNC: 0.7 MG/DL (ref 0.57–1)
DEPRECATED RDW RBC AUTO: 43.6 FL (ref 37–54)
EGFRCR SERPLBLD CKD-EPI 2021: 101.6 ML/MIN/1.73
EOSINOPHIL # BLD AUTO: 0.27 10*3/MM3 (ref 0–0.4)
EOSINOPHIL NFR BLD AUTO: 3 % (ref 0.3–6.2)
ERYTHROCYTE [DISTWIDTH] IN BLOOD BY AUTOMATED COUNT: 12.9 % (ref 12.3–15.4)
GLUCOSE SERPL-MCNC: 107 MG/DL (ref 65–99)
HCT VFR BLD AUTO: 42.3 % (ref 34–46.6)
HGB BLD-MCNC: 14 G/DL (ref 12–15.9)
IMM GRANULOCYTES # BLD AUTO: 0.03 10*3/MM3 (ref 0–0.05)
IMM GRANULOCYTES NFR BLD AUTO: 0.3 % (ref 0–0.5)
LYMPHOCYTES # BLD AUTO: 3.9 10*3/MM3 (ref 0.7–3.1)
LYMPHOCYTES NFR BLD AUTO: 43.7 % (ref 19.6–45.3)
MAGNESIUM SERPL-MCNC: 1.9 MG/DL (ref 1.6–2.6)
MCH RBC QN AUTO: 30 PG (ref 26.6–33)
MCHC RBC AUTO-ENTMCNC: 33.1 G/DL (ref 31.5–35.7)
MCV RBC AUTO: 90.6 FL (ref 79–97)
MONOCYTES # BLD AUTO: 0.53 10*3/MM3 (ref 0.1–0.9)
MONOCYTES NFR BLD AUTO: 5.9 % (ref 5–12)
NEUTROPHILS NFR BLD AUTO: 4.17 10*3/MM3 (ref 1.7–7)
NEUTROPHILS NFR BLD AUTO: 46.8 % (ref 42.7–76)
PLATELET # BLD AUTO: 361 10*3/MM3 (ref 140–450)
PMV BLD AUTO: 9 FL (ref 6–12)
POTASSIUM SERPL-SCNC: 3.9 MMOL/L (ref 3.5–5.2)
QT INTERVAL: 363 MS
QTC INTERVAL: 454 MS
RBC # BLD AUTO: 4.67 10*6/MM3 (ref 3.77–5.28)
SODIUM SERPL-SCNC: 140 MMOL/L (ref 136–145)
WBC NRBC COR # BLD AUTO: 8.93 10*3/MM3 (ref 3.4–10.8)

## 2024-11-22 PROCEDURE — 71046 X-RAY EXAM CHEST 2 VIEWS: CPT

## 2024-11-22 PROCEDURE — 83735 ASSAY OF MAGNESIUM: CPT | Performed by: EMERGENCY MEDICINE

## 2024-11-22 PROCEDURE — 93005 ELECTROCARDIOGRAM TRACING: CPT | Performed by: EMERGENCY MEDICINE

## 2024-11-22 PROCEDURE — 99284 EMERGENCY DEPT VISIT MOD MDM: CPT | Performed by: EMERGENCY MEDICINE

## 2024-11-22 PROCEDURE — 99284 EMERGENCY DEPT VISIT MOD MDM: CPT

## 2024-11-22 PROCEDURE — 85025 COMPLETE CBC W/AUTO DIFF WBC: CPT | Performed by: EMERGENCY MEDICINE

## 2024-11-22 PROCEDURE — 80048 BASIC METABOLIC PNL TOTAL CA: CPT | Performed by: EMERGENCY MEDICINE

## 2024-11-22 RX ORDER — METOPROLOL TARTRATE 50 MG
50 TABLET ORAL 2 TIMES DAILY
Qty: 20 TABLET | Refills: 0 | Status: SHIPPED | OUTPATIENT
Start: 2024-11-22

## 2024-11-22 NOTE — DISCHARGE INSTRUCTIONS
Return ED fever, chest pain, shortness of air, syncope, palpitations, worse condition, any other concerns

## 2024-11-22 NOTE — FSED PROVIDER NOTE
Subjective   History of Present Illness  59yo female pmh significant htn presents ED c/o elevated blood pressure readings and anxiety associated with same.  Pt reports had brief episode anxiety reaction/palpitations 1wk previously, resolved.  Pt states subsequently has been monitoring elevated blood pressure readings with great concern, as she was recently weaned off of her metoprolol antihypertensive medication.  Pt reports mild ha this morning, resolved with ASA.  Denies acute complaints at time of examination.  ROS neg current ha/chest pain/soa/abd pain/nausea/vomiting/paresthesia/motor weakness.    History provided by:  Patient  Palpitations  Associated symptoms: no chest pain        Review of Systems   Constitutional: Negative.    HENT: Negative.     Eyes: Negative.    Respiratory: Negative.     Cardiovascular:  Positive for palpitations. Negative for chest pain and leg swelling.   Gastrointestinal: Negative.    Genitourinary: Negative.    Neurological: Negative.  Negative for syncope.   Psychiatric/Behavioral:  The patient is nervous/anxious.    All other systems reviewed and are negative.      Past Medical History:   Diagnosis Date    Anxiety     Cholelithiasis 1993    It was removed    Hypertension     MADISON (obstructive sleep apnea) 05/10/2022    Home sleep study.  Weight 237 pounds.  Mild MADISON with AHI 11.5 events per hour.  Low oxygen saturation 75% and sleep-related hypoxia present for 32 minutes       No Known Allergies    Past Surgical History:   Procedure Laterality Date    APPENDECTOMY  1978    CHOLECYSTECTOMY  1993    COLONOSCOPY N/A 12/17/2020    Procedure: COLONOSCOPY TO CECUM;  Surgeon: Jesus Rasheed MD;  Location: [facility] ENDOSCOPY;  Service: General;  Laterality: N/A;  PRE-SCREENING  POST- DIVERTICULOSIS    MOLE REMOVAL         Family History   Problem Relation Age of Onset    No Known Problems Mother     Heart disease Father     Hyperlipidemia Father     Diabetes Father     Diabetes Sister      Cancer Paternal Aunt     Cancer Paternal Aunt         Her2 positive    Cancer Paternal Aunt     Arthritis Maternal Grandmother     COPD Maternal Grandmother         She was not a smoker.  Her  of 50+ years was though       Social History     Socioeconomic History    Marital status:     Number of children: 2   Tobacco Use    Smoking status: Never     Passive exposure: Yes    Smokeless tobacco: Never    Tobacco comments:     Lived with a father who was a smoker.   Vaping Use    Vaping status: Never Used   Substance and Sexual Activity    Alcohol use: Yes     Comment: glass of wine, beer, cocktails (social gatherings)    Drug use: No    Sexual activity: Not Currently     Partners: Female     Birth control/protection: Condom, Post-menopausal           Objective   Physical Exam  Vitals and nursing note reviewed.   Constitutional:       Appearance: Normal appearance.   HENT:      Head: Normocephalic and atraumatic.      Right Ear: External ear normal.      Left Ear: External ear normal.      Nose: Nose normal.      Mouth/Throat:      Mouth: Mucous membranes are moist.      Pharynx: Oropharynx is clear. No oropharyngeal exudate or posterior oropharyngeal erythema.   Eyes:      Pupils: Pupils are equal, round, and reactive to light.   Cardiovascular:      Rate and Rhythm: Normal rate and regular rhythm.      Pulses: Normal pulses.      Heart sounds: Normal heart sounds. No murmur heard.     No friction rub. No gallop.   Pulmonary:      Effort: Pulmonary effort is normal. No respiratory distress.      Breath sounds: Normal breath sounds. No wheezing, rhonchi or rales.   Abdominal:      General: Abdomen is flat. Bowel sounds are normal. There is no distension.      Palpations: Abdomen is soft.      Tenderness: There is no abdominal tenderness. There is no guarding or rebound.   Musculoskeletal:         General: No swelling or deformity.      Cervical back: Normal range of motion and neck supple. No rigidity.       Right lower leg: No edema.      Left lower leg: No edema.   Lymphadenopathy:      Cervical: No cervical adenopathy.   Skin:     General: Skin is warm and dry.   Neurological:      General: No focal deficit present.      Mental Status: She is alert and oriented to person, place, and time.      GCS: GCS eye subscore is 4. GCS verbal subscore is 5. GCS motor subscore is 6.      Sensory: Sensation is intact.      Motor: Motor function is intact.         ECG 12 Lead      Date/Time: 11/22/2024 9:08 AM    Performed by: Edwin Osorio MD  Authorized by: Edwin Osorio MD  Interpreted by ED physician  Rhythm: sinus rhythm  Rate: normal  BPM: 94  QRS axis: left  Conduction: conduction normal  ST Segments: ST segments normal  T Waves: T waves normal  Other findings: LVH and LAE  Clinical impression: abnormal ECG               ED Course      Labs Reviewed   BASIC METABOLIC PANEL - Abnormal; Notable for the following components:       Result Value    Glucose 107 (*)     All other components within normal limits    Narrative:     GFR Normal >60  Chronic Kidney Disease <60  Kidney Failure <15     CBC WITH AUTO DIFFERENTIAL - Abnormal; Notable for the following components:    Lymphocytes, Absolute 3.90 (*)     All other components within normal limits   MAGNESIUM - Normal   CBC AND DIFFERENTIAL    Narrative:     The following orders were created for panel order CBC & Differential.  Procedure                               Abnormality         Status                     ---------                               -----------         ------                     CBC Auto Differential[[id number]]        Abnormal            Final result                 Please view results for these tests on the individual orders.     XR Chest 2 View    Result Date: 11/22/2024  Narrative: XR CHEST 2 VW-   INDICATION: Palpitations  COMPARISON: Chest radiograph March 9, 2022  TECHNIQUE: 2 view chest  FINDINGS:  Low lung volumes. Ill-defined retrocardiac  opacity. No effusions. Stable mediastinum. Heart is normal in size. Cholecystectomy clips.      Impression: Ill-defined retrocardiac opacity. Could be atelectasis. Pneumonia in the appropriate clinical setting. Deep inspiration would better evaluate the lung bases.  This report was finalized on 11/22/2024 9:23 AM by Dr. Tramaine Mckee M.D on Workstation: [id number]                                          Medical Decision Making  Labs/radiographic findings reviewed.  EKG: NSR/rate 94/LAD/LVH/LAE/normal qtc/no acute STTW changes.  CXR: retrocardiac atelectasis.  CBC/BMP: unremarkable.  No evidence end organ damage. No cardiorespiratory symptoms; denies chest pain/cough.  Stable discharge with pmd followup. Plan metoprolol 50mg bid.  Strict return precautions.    Problems Addressed:  Essential hypertension: chronic illness or injury  Palpitations: chronic illness or injury    Amount and/or Complexity of Data Reviewed  Labs: ordered.  Radiology: ordered.  ECG/medicine tests: ordered and independent interpretation performed.    Risk  Prescription drug management.        Final diagnoses:   Essential hypertension   Palpitations       ED Disposition  ED Disposition       ED Disposition   Discharge    Condition   Good    Comment   --               Parrish Hawkins MD  [address]  [place] [id number] [phone]    Schedule an appointment as soon as possible for a visit in 2 days           Medication List        New Prescriptions      metoprolol tartrate 50 MG tablet  Commonly known as: LOPRESSOR  Take 1 tablet by mouth 2 (Two) Times a Day.               Where to Get Your Medications        These medications were sent to [id number] - [place], [place] - [address] AT [facility] - [phone]  - [phone] FX  [address] [place] [address]      Phone: [phone]   metoprolol tartrate 50 MG tablet

## 2024-11-25 ENCOUNTER — OFFICE VISIT (OUTPATIENT)
Dept: FAMILY MEDICINE CLINIC | Facility: CLINIC | Age: 59
End: 2024-11-25
Payer: COMMERCIAL

## 2024-11-25 VITALS
BODY MASS INDEX: 36.37 KG/M2 | TEMPERATURE: 97.3 F | WEIGHT: 213 LBS | RESPIRATION RATE: 14 BRPM | DIASTOLIC BLOOD PRESSURE: 86 MMHG | HEIGHT: 64 IN | SYSTOLIC BLOOD PRESSURE: 116 MMHG | HEART RATE: 65 BPM

## 2024-11-25 DIAGNOSIS — I10 ESSENTIAL HYPERTENSION: Primary | Chronic | ICD-10-CM

## 2024-11-25 DIAGNOSIS — R45.89 ANXIETY ABOUT HEALTH: Chronic | ICD-10-CM

## 2024-11-25 PROCEDURE — 99214 OFFICE O/P EST MOD 30 MIN: CPT | Performed by: NURSE PRACTITIONER

## 2024-11-25 NOTE — PROGRESS NOTES
"Chief Complaint  Essential hypertension    Subjective        Stacy Salomon presents to [facility]  History of Present Illness    History of Present Illness  The patient presents for evaluation of hypertension.    She experienced an episode of elevated blood pressure on 11/21/2024, with a reading of 167/102, prompting her to seek care at an urgent care facility. She reported feeling unwell but not dizzy. This episode was reminiscent of a similar event three years prior, which led to panic attacks. She was previously on metoprolol, which was recently discontinued as her condition had improved. However, she experienced palpitations and a sensation of blood rushing to her head while at [employer], which caused her to panic. She resumed taking metoprolol after this incident. She had been off metoprolol for one to two weeks, having gradually reduced her dose from 10 mg to half a pill over two weeks. She was prescribed metoprolol 50 mg twice daily at the emergency room. She has been monitoring her blood pressure three times daily. She is unsure of her heart rate but reports that it was 67 recently, with a range of 46 to 141. Her resting heart rate is between 63 and 79, her walking average is between 101 and 119, and her workout range is between 75 and 113.    She reports no fatigue, shortness of breath, or acid reflux. She is currently under stress due to her work as an  and  and has been avoiding physical activity due to fear of a heart attack. She is also experiencing sleep disturbances due to fear, panic, and anxiety. She is reluctant to take anxiety medication and prefers to continue with metoprolol if it helps manage her anxiety.    Her  passed away from a heart attack seven years ago, which has left her with a fear of sudden death.       Objective   Vital Signs:  /86   Pulse 65   Temp 97.3 °F (36.3 °C) (Infrared)   Resp 14   Ht 161.3 cm (63.5\")   " "Wt 96.6 kg (213 lb)   BMI 37.14 kg/m²   Estimated body mass index is 37.14 kg/m² as calculated from the following:    Height as of this encounter: 161.3 cm (63.5\").    Weight as of this encounter: 96.6 kg (213 lb).             Physical Exam  Vitals and nursing note reviewed.   Constitutional:       General: She is not in acute distress.     Appearance: She is well-developed. She is not ill-appearing or diaphoretic.   HENT:      Head: Normocephalic and atraumatic.   Eyes:      General:         Right eye: No discharge.         Left eye: No discharge.      Conjunctiva/sclera: Conjunctivae normal.   Cardiovascular:      Rate and Rhythm: Normal rate and regular rhythm.   Pulmonary:      Effort: Pulmonary effort is normal.      Breath sounds: Normal breath sounds.   Abdominal:      General: Bowel sounds are normal.      Palpations: Abdomen is soft.   Musculoskeletal:         General: No deformity.      Comments: Gait smooth and steady   Skin:     General: Skin is warm and dry.   Neurological:      Mental Status: She is alert and oriented to person, place, and time.   Psychiatric:         Mood and Affect: Mood normal.         Behavior: Behavior normal.          Physical Exam      Vital Signs  Blood pressure is 116/86.     Result Review :            Results      Testing  EKG shows possible left ventricular hypertrophy.                Assessment and Plan     Diagnoses and all orders for this visit:    1. Essential hypertension (Primary)    2. Anxiety about health        Assessment & Plan  1. Hypertension.  Her EKG indicates potential left ventricular hypertrophy, likely due to elevated blood pressure. Anxiety appears to be a contributing factor to her hypertension. She was advised to bring her blood pressure cuff to the office for a nurse visit to ensure its accuracy. Instructions were given on the correct method of measuring blood pressure, including sitting for at least 5 minutes, not talking, having the arm supported, " and ensuring the bladder is empty. She was also advised to monitor her heart rate while on metoprolol.     She was prescribed metoprolol 50 mg twice a day for 10 days and will continue this. She will message PCP regarding her blood pressure readings. She was encouraged to engage in physical activities such as walking, yoga, and strength training. The importance of maintaining a calm demeanor was emphasized. If her blood pressure remains stable, she may return to metoprolol 10 mg daily-but should discuss with PCP.  I do not think we need to make any changes to blood pressure regimen at this point.  Blood pressure today shows good systolic blood pressure although diastolic blood pressure is little bit high.    2. Anxiety.  Anxiety is contributing to her elevated blood pressure. Metoprolol may help manage both her blood pressure and anxiety symptoms. She was advised to monitor her heart rate and report any significant changes. She was encouraged to engage in physical activities such as walking, yoga, and strength training to help manage anxiety. She expressed a preference to avoid additional anxiety medications if possible.              Follow Up     No follow-ups on file.  Patient was given instructions and counseling regarding her condition or for health maintenance advice. Please see specific information pulled into the AVS if appropriate.    Patient or patient representative verbalized consent for the use of Ambient Listening during the visit with  ADRI Rodgers for chart documentation. 12/9/2024  07:34 EST

## 2024-12-22 ENCOUNTER — PATIENT MESSAGE (OUTPATIENT)
Dept: FAMILY MEDICINE CLINIC | Facility: CLINIC | Age: 59
End: 2024-12-22
Payer: COMMERCIAL

## 2024-12-23 RX ORDER — METOPROLOL SUCCINATE 25 MG/1
25 TABLET, EXTENDED RELEASE ORAL DAILY
Qty: 90 TABLET | Refills: 1 | Status: SHIPPED | OUTPATIENT
Start: 2024-12-23

## 2024-12-23 NOTE — TELEPHONE ENCOUNTER
Rx Refill Note  Requested Prescriptions     Pending Prescriptions Disp Refills    metoprolol succinate XL (TOPROL-XL) 25 MG 24 hr tablet 90 tablet 1     Sig: Take 1 tablet by mouth Daily.      Last office visit with prescribing clinician: 10/8/2024   Last telemedicine visit with prescribing clinician: Visit date not found   Next office visit with prescribing clinician: 4/21/2025                         Would you like a call back once the refill request has been completed: [] Yes [] No    If the office needs to give you a call back, can they leave a voicemail: [] Yes [] No    Gifty Calvin LPN  12/23/24, 07:52 EST

## 2025-02-07 RX ORDER — ALBUTEROL SULFATE 90 UG/1
2 INHALANT RESPIRATORY (INHALATION) EVERY 4 HOURS PRN
Qty: 18 G | Refills: 1 | Status: SHIPPED | OUTPATIENT
Start: 2025-02-07

## 2025-02-07 NOTE — TELEPHONE ENCOUNTER
Rx Refill Note  Requested Prescriptions     Pending Prescriptions Disp Refills    albuterol sulfate  (90 Base) MCG/ACT inhaler 18 g 1     Sig: Inhale 2 puffs Every 4 (Four) Hours As Needed for Wheezing.      Last office visit with prescribing clinician: 10/8/2024   Last telemedicine visit with prescribing clinician: Visit date not found   Next office visit with prescribing clinician: 4/21/2025                         Would you like a call back once the refill request has been completed: [] Yes [] No    If the office needs to give you a call back, can they leave a voicemail: [] Yes [] No    Miky Churchill  02/07/25, 14:12 EST

## 2025-02-07 NOTE — TELEPHONE ENCOUNTER
Caller: Stacy Salomon    Relationship: Self    Best call back number: [phone]     Requested Prescriptions:   Requested Prescriptions     Pending Prescriptions Disp Refills    albuterol sulfate  (90 Base) MCG/ACT inhaler       Sig: Inhale 2 puffs Every 4 (Four) Hours As Needed for Wheezing.        Pharmacy where request should be sent: [id number] - [facility] [id number] [place] [address] AT [facility] [phone] [facility] [phone]      Last office visit with prescribing clinician: 10/8/2024   Last telemedicine visit with prescribing clinician: Visit date not found   Next office visit with prescribing clinician: 4/21/2025     Additional details provided by patient:      Does the patient have less than a 3 day supply:  [x] Yes  [] No    Would you like a call back once the refill request has been completed: [x] Yes [] No    If the office needs to give you a call back, can they leave a voicemail: [x] Yes [] No    Kori Will Rep   02/07/25 11:56 EST

## 2025-04-30 ENCOUNTER — OFFICE VISIT (OUTPATIENT)
Dept: FAMILY MEDICINE CLINIC | Facility: CLINIC | Age: 60
End: 2025-04-30
Payer: COMMERCIAL

## 2025-04-30 VITALS
BODY MASS INDEX: 34.3 KG/M2 | WEIGHT: 200.9 LBS | HEIGHT: 64 IN | RESPIRATION RATE: 17 BRPM | OXYGEN SATURATION: 95 % | DIASTOLIC BLOOD PRESSURE: 60 MMHG | HEART RATE: 85 BPM | SYSTOLIC BLOOD PRESSURE: 108 MMHG | TEMPERATURE: 97.6 F

## 2025-04-30 DIAGNOSIS — Z00.00 HEALTH CARE MAINTENANCE: Primary | ICD-10-CM

## 2025-04-30 DIAGNOSIS — I10 ESSENTIAL HYPERTENSION: Chronic | ICD-10-CM

## 2025-04-30 RX ORDER — METOPROLOL SUCCINATE 25 MG/1
25 TABLET, EXTENDED RELEASE ORAL DAILY
Qty: 90 TABLET | Refills: 3 | Status: SHIPPED | OUTPATIENT
Start: 2025-04-30

## 2025-04-30 NOTE — PROGRESS NOTES
"Chief Complaint  Hypertension    Subjective        Stacy Salomon presents to [facility]  History of Present Illness    Annual healthcare maintenance visit.  Non-smoker.  No alcohol use.  Some exercise.  She is been watching her diet.  She is taking semaglutide via a retail pharmacy mail-order.  She states she is lost 30 or 40 pounds.  Feeling good.  No significant adverse effects.  It does reduce her postcholecystectomy loose stool.  She has no personal history of medullary thyroid carcinoma.  She continues on metoprolol for essential hypertension and previous tachycardia.  No adverse effects.  He also helps with anxiety she states.    Objective   Vital Signs:  /60   Pulse 85   Temp 97.6 °F (36.4 °C) (Oral)   Resp 17   Ht 161.3 cm (63.5\")   Wt 91.1 kg (200 lb 14.4 oz)   SpO2 95%   BMI 35.03 kg/m²   Estimated body mass index is 35.03 kg/m² as calculated from the following:    Height as of this encounter: 161.3 cm (63.5\").    Weight as of this encounter: 91.1 kg (200 lb 14.4 oz).          Physical Exam  Constitutional:       Appearance: Normal appearance.   HENT:      Head: Atraumatic.      Right Ear: Tympanic membrane and ear canal normal.      Left Ear: Tympanic membrane and ear canal normal.      Mouth/Throat:      Pharynx: Oropharynx is clear. No oropharyngeal exudate or posterior oropharyngeal erythema.   Eyes:      Conjunctiva/sclera: Conjunctivae normal.   Neck:      Comments: Thyroid examination unremarkable  Cardiovascular:      Rate and Rhythm: Normal rate and regular rhythm.      Pulses: Normal pulses.      Heart sounds: Normal heart sounds.   Pulmonary:      Effort: Pulmonary effort is normal.      Breath sounds: Normal breath sounds.   Abdominal:      General: Abdomen is flat. There is no distension.      Palpations: Abdomen is soft. There is no mass.      Tenderness: There is no abdominal tenderness.      Hernia: No hernia is present.   Musculoskeletal:         " General: Normal range of motion.      Cervical back: Normal range of motion and neck supple. No muscular tenderness.   Lymphadenopathy:      Cervical: No cervical adenopathy.   Skin:     General: Skin is warm and dry.      Findings: No rash.   Neurological:      General: No focal deficit present.      Mental Status: She is alert and oriented to person, place, and time.   Psychiatric:         Mood and Affect: Mood normal.        Result Review :  The following data was reviewed by: Parrish Hawkins MD on 04/30/2025:  Common labs          9/25/2024    08:41 9/25/2024    08:45 11/22/2024    08:58 4/17/2025    10:14   Common Labs   Glucose 89   107  91    BUN 21   17  20    Creatinine 0.68   0.70  0.65    Sodium 140   140  144    Potassium 4.6   3.9  4.3    Chloride 105   102  109    Calcium 8.9   9.5  9.6    Albumin 4.2    4.3    Total Bilirubin 0.3    0.2    Alkaline Phosphatase 90    93    AST (SGOT) 14    10    ALT (SGPT) 19    14    WBC   8.93  6.77    Hemoglobin   14.0  13.3    Hematocrit   42.3  40.5    Platelets   361  383    Total Cholesterol 198    203    Triglycerides 55    60    HDL Cholesterol 70    70    LDL Cholesterol  118    122    Hemoglobin A1C  5.40   5.40                  Assessment and Plan   Diagnoses and all orders for this visit:    1. Health care maintenance (Primary)  -     Hemoglobin A1c; Future  -     CBC & Differential; Future  -     Comprehensive Metabolic Panel; Future  -     Lipid Panel; Future  -     TSH+Free T4; Future    2. Essential hypertension  -     Hemoglobin A1c; Future  -     CBC & Differential; Future  -     Comprehensive Metabolic Panel; Future  -     Lipid Panel; Future  -     TSH+Free T4; Future    Other orders  -     metoprolol succinate XL (TOPROL-XL) 25 MG 24 hr tablet; Take 1 tablet by mouth Daily.  Dispense: 90 tablet; Refill: 3      Annual healthcare maintenance visit.    Immunizations reviewed and recommended at a retail pharmacy.    Hypertension.  History of  tachycardia.  Continue metoprolol at low-dose.  She states it also helps with anxiety symptoms.  She is having no adverse effects.    Obesity.  BMI greater than 30.  She is taking semaglutide through a mail-order retail pharmacy.  Prescribed by an outside provider.  She has no contraindications to the medication that I can see.    Lipid panel reviewed.  Cardiovascular risk assessment likely low to intermediate.  Likely not high.    Breast cancer screening through her gynecologist.    Cervical cancer screening through her gynecologist.    Colon cancer screening up-to-date.    See me in 1 year.  Sooner as needed.       Follow Up   No follow-ups on file.  Patient was given instructions and counseling regarding her condition or for health maintenance advice. Please see specific information pulled into the AVS if appropriate.

## 2025-06-24 ENCOUNTER — TELEPHONE (OUTPATIENT)
Dept: FAMILY MEDICINE CLINIC | Facility: CLINIC | Age: 60
End: 2025-06-24
Payer: COMMERCIAL

## 2025-06-24 NOTE — TELEPHONE ENCOUNTER
Relay     Refill fax request received from [email or web address]  for a refill of Metoprolol on [address] called to confirm if pt is using [facility]. Last refill was sent to [email or web address] in April. No answer lvm

## 2025-07-15 NOTE — TELEPHONE ENCOUNTER
Rx Refill Note  Requested Prescriptions     Pending Prescriptions Disp Refills    metoprolol succinate XL (TOPROL-XL) 25 MG 24 hr tablet 90 tablet 3     Sig: Take 1 tablet by mouth Daily.      Last office visit with prescribing clinician: 4/30/2025   Last telemedicine visit with prescribing clinician: Visit date not found   Next office visit with prescribing clinician: 10/20/2025                         Would you like a call back once the refill request has been completed: [] Yes [] No    If the office needs to give you a call back, can they leave a voicemail: [] Yes [] No    Isi Araiza MA  07/15/25, 13:49 EDT

## 2025-07-16 RX ORDER — METOPROLOL SUCCINATE 25 MG/1
25 TABLET, EXTENDED RELEASE ORAL DAILY
Qty: 90 TABLET | Refills: 3 | Status: SHIPPED | OUTPATIENT
Start: 2025-07-16

## (undated) DEVICE — KT ORCA ORCAPOD DISP STRL

## (undated) DEVICE — ADAPT CLN BIOGUARD AIR/H2O DISP

## (undated) DEVICE — THE TORRENT IRRIGATION SCOPE CONNECTOR IS USED WITH THE TORRENT IRRIGATION TUBING TO PROVIDE IRRIGATION FLUIDS SUCH AS STERILE WATER DURING GASTROINTESTINAL ENDOSCOPIC PROCEDURES WHEN USED IN CONJUNCTION WITH AN IRRIGATION PUMP (OR ELECTROSURGICAL UNIT).: Brand: TORRENT

## (undated) DEVICE — CANN O2 ETCO2 FITS ALL CONN CO2 SMPL A/ 7IN DISP LF

## (undated) DEVICE — LN SMPL CO2 SHTRM SD STREAM W/M LUER

## (undated) DEVICE — TUBING, SUCTION, 1/4" X 10', STRAIGHT: Brand: MEDLINE

## (undated) DEVICE — SENSR O2 OXIMAX FNGR A/ 18IN NONSTR